# Patient Record
Sex: MALE | Race: WHITE | HISPANIC OR LATINO | URBAN - METROPOLITAN AREA
[De-identification: names, ages, dates, MRNs, and addresses within clinical notes are randomized per-mention and may not be internally consistent; named-entity substitution may affect disease eponyms.]

---

## 2019-04-30 ENCOUNTER — INPATIENT (INPATIENT)
Facility: HOSPITAL | Age: 69
LOS: 11 days | Discharge: HOME | DRG: 234 | End: 2019-05-12
Attending: THORACIC SURGERY (CARDIOTHORACIC VASCULAR SURGERY) | Admitting: HOSPITALIST
Payer: MEDICARE

## 2019-04-30 VITALS
TEMPERATURE: 99 F | WEIGHT: 229.94 LBS | HEART RATE: 73 BPM | RESPIRATION RATE: 18 BRPM | OXYGEN SATURATION: 98 % | DIASTOLIC BLOOD PRESSURE: 105 MMHG | SYSTOLIC BLOOD PRESSURE: 174 MMHG | HEIGHT: 69 IN

## 2019-04-30 DIAGNOSIS — I20.9 ANGINA PECTORIS, UNSPECIFIED: ICD-10-CM

## 2019-04-30 LAB
ALBUMIN SERPL ELPH-MCNC: 4.2 G/DL — SIGNIFICANT CHANGE UP (ref 3.3–5.2)
ALP SERPL-CCNC: 76 U/L — SIGNIFICANT CHANGE UP (ref 40–120)
ALT FLD-CCNC: 14 U/L — SIGNIFICANT CHANGE UP
ANION GAP SERPL CALC-SCNC: 13 MMOL/L — SIGNIFICANT CHANGE UP (ref 5–17)
APTT BLD: 36.7 SEC — HIGH (ref 27.5–36.3)
AST SERPL-CCNC: 19 U/L — SIGNIFICANT CHANGE UP
BASOPHILS # BLD AUTO: 0 K/UL — SIGNIFICANT CHANGE UP (ref 0–0.2)
BASOPHILS NFR BLD AUTO: 0.2 % — SIGNIFICANT CHANGE UP (ref 0–2)
BILIRUB SERPL-MCNC: 0.5 MG/DL — SIGNIFICANT CHANGE UP (ref 0.4–2)
BUN SERPL-MCNC: 14 MG/DL — SIGNIFICANT CHANGE UP (ref 8–20)
CALCIUM SERPL-MCNC: 9.1 MG/DL — SIGNIFICANT CHANGE UP (ref 8.6–10.2)
CHLORIDE SERPL-SCNC: 100 MMOL/L — SIGNIFICANT CHANGE UP (ref 98–107)
CO2 SERPL-SCNC: 24 MMOL/L — SIGNIFICANT CHANGE UP (ref 22–29)
CREAT SERPL-MCNC: 0.95 MG/DL — SIGNIFICANT CHANGE UP (ref 0.5–1.3)
EOSINOPHIL # BLD AUTO: 0.1 K/UL — SIGNIFICANT CHANGE UP (ref 0–0.5)
EOSINOPHIL NFR BLD AUTO: 2.5 % — SIGNIFICANT CHANGE UP (ref 0–5)
GLUCOSE SERPL-MCNC: 101 MG/DL — SIGNIFICANT CHANGE UP (ref 70–115)
HCT VFR BLD CALC: 40.3 % — LOW (ref 42–52)
HGB BLD-MCNC: 13.2 G/DL — LOW (ref 14–18)
INR BLD: 1.03 RATIO — SIGNIFICANT CHANGE UP (ref 0.88–1.16)
LYMPHOCYTES # BLD AUTO: 1.6 K/UL — SIGNIFICANT CHANGE UP (ref 1–4.8)
LYMPHOCYTES # BLD AUTO: 36.2 % — SIGNIFICANT CHANGE UP (ref 20–55)
MCHC RBC-ENTMCNC: 23.2 PG — LOW (ref 27–31)
MCHC RBC-ENTMCNC: 32.8 G/DL — SIGNIFICANT CHANGE UP (ref 32–36)
MCV RBC AUTO: 70.8 FL — LOW (ref 80–94)
MONOCYTES # BLD AUTO: 0.5 K/UL — SIGNIFICANT CHANGE UP (ref 0–0.8)
MONOCYTES NFR BLD AUTO: 10.7 % — HIGH (ref 3–10)
NEUTROPHILS # BLD AUTO: 2.2 K/UL — SIGNIFICANT CHANGE UP (ref 1.8–8)
NEUTROPHILS NFR BLD AUTO: 50.2 % — SIGNIFICANT CHANGE UP (ref 37–73)
NT-PROBNP SERPL-SCNC: 35 PG/ML — SIGNIFICANT CHANGE UP (ref 0–300)
PLATELET # BLD AUTO: 176 K/UL — SIGNIFICANT CHANGE UP (ref 150–400)
POTASSIUM SERPL-MCNC: 4.2 MMOL/L — SIGNIFICANT CHANGE UP (ref 3.5–5.3)
POTASSIUM SERPL-SCNC: 4.2 MMOL/L — SIGNIFICANT CHANGE UP (ref 3.5–5.3)
PROT SERPL-MCNC: 8 G/DL — SIGNIFICANT CHANGE UP (ref 6.6–8.7)
PROTHROM AB SERPL-ACNC: 11.9 SEC — SIGNIFICANT CHANGE UP (ref 10–12.9)
RBC # BLD: 5.69 M/UL — SIGNIFICANT CHANGE UP (ref 4.6–6.2)
RBC # FLD: 15.7 % — HIGH (ref 11–15.6)
SODIUM SERPL-SCNC: 137 MMOL/L — SIGNIFICANT CHANGE UP (ref 135–145)
TROPONIN T SERPL-MCNC: <0.01 NG/ML — SIGNIFICANT CHANGE UP (ref 0–0.06)
TROPONIN T SERPL-MCNC: <0.01 NG/ML — SIGNIFICANT CHANGE UP (ref 0–0.06)
WBC # BLD: 4.5 K/UL — LOW (ref 4.8–10.8)
WBC # FLD AUTO: 4.5 K/UL — LOW (ref 4.8–10.8)

## 2019-04-30 PROCEDURE — 71046 X-RAY EXAM CHEST 2 VIEWS: CPT | Mod: 26

## 2019-04-30 PROCEDURE — 93010 ELECTROCARDIOGRAM REPORT: CPT | Mod: 76

## 2019-04-30 PROCEDURE — 75574 CT ANGIO HRT W/3D IMAGE: CPT | Mod: 26

## 2019-04-30 PROCEDURE — 99218: CPT

## 2019-04-30 PROCEDURE — 99223 1ST HOSP IP/OBS HIGH 75: CPT | Mod: AI

## 2019-04-30 RX ORDER — METOPROLOL TARTRATE 50 MG
12.5 TABLET ORAL EVERY 12 HOURS
Qty: 0 | Refills: 0 | Status: DISCONTINUED | OUTPATIENT
Start: 2019-04-30 | End: 2019-05-02

## 2019-04-30 RX ORDER — NITROGLYCERIN 6.5 MG
1 CAPSULE, EXTENDED RELEASE ORAL EVERY 8 HOURS
Qty: 0 | Refills: 0 | Status: DISCONTINUED | OUTPATIENT
Start: 2019-04-30 | End: 2019-05-03

## 2019-04-30 RX ORDER — GEMFIBROZIL 600 MG
600 TABLET ORAL
Qty: 0 | Refills: 0 | Status: DISCONTINUED | OUTPATIENT
Start: 2019-04-30 | End: 2019-05-04

## 2019-04-30 RX ORDER — ASPIRIN/CALCIUM CARB/MAGNESIUM 324 MG
325 TABLET ORAL ONCE
Qty: 0 | Refills: 0 | Status: COMPLETED | OUTPATIENT
Start: 2019-04-30 | End: 2019-04-30

## 2019-04-30 RX ORDER — ENOXAPARIN SODIUM 100 MG/ML
40 INJECTION SUBCUTANEOUS DAILY
Qty: 0 | Refills: 0 | Status: DISCONTINUED | OUTPATIENT
Start: 2019-04-30 | End: 2019-05-01

## 2019-04-30 RX ORDER — METOPROLOL TARTRATE 50 MG
25 TABLET ORAL
Qty: 0 | Refills: 0 | Status: DISCONTINUED | OUTPATIENT
Start: 2019-04-30 | End: 2019-04-30

## 2019-04-30 RX ORDER — AMLODIPINE BESYLATE 2.5 MG/1
5 TABLET ORAL DAILY
Qty: 0 | Refills: 0 | Status: DISCONTINUED | OUTPATIENT
Start: 2019-04-30 | End: 2019-05-03

## 2019-04-30 RX ORDER — ATORVASTATIN CALCIUM 80 MG/1
20 TABLET, FILM COATED ORAL AT BEDTIME
Qty: 0 | Refills: 0 | Status: DISCONTINUED | OUTPATIENT
Start: 2019-04-30 | End: 2019-05-04

## 2019-04-30 RX ORDER — METOPROLOL TARTRATE 50 MG
50 TABLET ORAL ONCE
Qty: 0 | Refills: 0 | Status: COMPLETED | OUTPATIENT
Start: 2019-04-30 | End: 2019-04-30

## 2019-04-30 RX ORDER — ONDANSETRON 8 MG/1
4 TABLET, FILM COATED ORAL ONCE
Qty: 0 | Refills: 0 | Status: COMPLETED | OUTPATIENT
Start: 2019-04-30 | End: 2019-04-30

## 2019-04-30 RX ORDER — ASPIRIN/CALCIUM CARB/MAGNESIUM 324 MG
81 TABLET ORAL DAILY
Qty: 0 | Refills: 0 | Status: DISCONTINUED | OUTPATIENT
Start: 2019-04-30 | End: 2019-05-06

## 2019-04-30 RX ORDER — MORPHINE SULFATE 50 MG/1
2 CAPSULE, EXTENDED RELEASE ORAL ONCE
Qty: 0 | Refills: 0 | Status: DISCONTINUED | OUTPATIENT
Start: 2019-04-30 | End: 2019-04-30

## 2019-04-30 RX ADMIN — MORPHINE SULFATE 2 MILLIGRAM(S): 50 CAPSULE, EXTENDED RELEASE ORAL at 19:08

## 2019-04-30 RX ADMIN — MORPHINE SULFATE 2 MILLIGRAM(S): 50 CAPSULE, EXTENDED RELEASE ORAL at 18:24

## 2019-04-30 RX ADMIN — AMLODIPINE BESYLATE 5 MILLIGRAM(S): 2.5 TABLET ORAL at 18:23

## 2019-04-30 RX ADMIN — ATORVASTATIN CALCIUM 20 MILLIGRAM(S): 80 TABLET, FILM COATED ORAL at 22:39

## 2019-04-30 RX ADMIN — Medication 50 MILLIGRAM(S): at 12:20

## 2019-04-30 RX ADMIN — Medication 1 INCH(S): at 22:39

## 2019-04-30 RX ADMIN — Medication 600 MILLIGRAM(S): at 18:23

## 2019-04-30 RX ADMIN — ONDANSETRON 4 MILLIGRAM(S): 8 TABLET, FILM COATED ORAL at 21:18

## 2019-04-30 RX ADMIN — ENOXAPARIN SODIUM 40 MILLIGRAM(S): 100 INJECTION SUBCUTANEOUS at 18:24

## 2019-04-30 RX ADMIN — Medication 325 MILLIGRAM(S): at 12:19

## 2019-04-30 NOTE — ED ADULT NURSE NOTE - OBJECTIVE STATEMENT
pt awake, alert and oriented x3 c/o left sided chest pain radiating to left shoulder that began on Thursday with associated shortness of breath.  States he saw a cardiologist > 1 year ago.  Pt admits to drinking about 6 beers daily, more on the weekend.  Last drink last night.  NSR on cardiac monitor.  Did not take blood pressure medication today.  Respirations even and unlabored, denies chest pain.  Abdomen soft, nontender, nondistended.  Skin warm and dry.  Moving all extremities well and with purpose.

## 2019-04-30 NOTE — ED CDU PROVIDER DISPOSITION NOTE - CLINICAL COURSE
68 year old male with PMh HTN, HLD, etoh abuse presnets with chets pain, worse with exertion. Place don Obs, CT cardiac showed significant stenosis

## 2019-04-30 NOTE — SBIRT NOTE. - NSSBIRTSERVICES_GEN_A_ED_FT
Provided SBIRT services: Full screen positive. Brief Intervention Performed. Screening results were reviewed with the patient and patient was provided information about healthy guidelines and potential negative consequences associated with level of risk. Motivation and readiness to reduce or stop use was discussed and goals and activities to make changes were suggested/offered.  Audit Score: 14  DAST Score: 0  Duration = 15 Minutes

## 2019-04-30 NOTE — ED CDU PROVIDER INITIAL DAY NOTE - OBJECTIVE STATEMENT
Pt is a 69 y/o M, PMH significant for HTN, hypertriglyceridemia, esophageal varices, daily ETOH, presents to ED c/o L sided CP with ESCOBAR since Thursday. Pt states he was lifting boxes at work this Thursday when he became short or breath and felt a dull ache in this L sided of his chest radiating into his L arm. Pt states the symptoms have persisted and now has a tingling sensation radiating down his L arm into his L 5th digit. Pt currently describes the sensation as a "ache". Pt primary cardiologist is Dr. Duran ( last appointment > 1 year ago ). Pt has no personal cardiac history but states his brother had a cardiac cath in his 50s for episodes of CP. Pt with smoking history 30 years ago. Pt sates he drinks approximately 6 beers daily, last drink was last night, no history of withdrawals.

## 2019-04-30 NOTE — H&P ADULT - HISTORY OF PRESENT ILLNESS
67 yo male obese with HTN , alcoholism presented to the ED with complaints of left sided chest pain and shortness of breath . He states that has been feeling left chest discomfort since Thursday on off worsening sharp pain with no relieve completly , radiates to his back and left shoulder . associated with dyspnea on exertion heavy work , at present has pain 4/10 , he admits drinking 4-6 beers almost daily in the evening , last drink was last night . Evaluated in the Ed by cardiology m CTA of coronaries done showed CAD plaque , now being admitted for cardiac cath unstable angina .

## 2019-04-30 NOTE — ED STATDOCS - PROGRESS NOTE DETAILS
67 y/o M pt with hx of HLD presents to the ED with daughter c/o worsening L sided chest pain radiating into back, with assoc. ESCOBAR, and L arm tingling/numbness into hand since Thursday. Per daughter states pt last saw his cardiologist about 1 year ago. Pt is a non-smoker. Denies . No further complaints at this time.   PMD: Gutierrez  EKG: No acute ischemic changes.

## 2019-04-30 NOTE — ED ADULT NURSE REASSESSMENT NOTE - NS ED NURSE REASSESS COMMENT FT1
Assumed pt care at 1230.  pt received sleeping in stretcher, arousable to verbal stimuli.  NSR on cardiac monitor.  maintaining airway well.  pt received in yellow gown with no belongings at bedside. Will continue to monitor and reassess.
pt being admitted, hospitalist and family at bedside, no apparent distress noted

## 2019-04-30 NOTE — H&P ADULT - NSHPLABSRESULTS_GEN_ALL_CORE
13.2   4.5   )-----------( 176      ( 30 Apr 2019 12:08 )             40.3   04-30    137  |  100  |  14.0  ----------------------------<  101  4.2   |  24.0  |  0.95    Ca    9.1      30 Apr 2019 12:08  Mg     1.9     04-30    TPro  8.0  /  Alb  4.2  /  TBili  0.5  /  DBili  x   /  AST  19  /  ALT  14  /  AlkPhos  76  04-30  < from: CT Angio Cardiac w/ IV Cont (04.30.19 @ 15:55) >    IMPRESSION:   Significant stenosis of the mid LAD adjacent calcification as well as mid   circumflex.  Left ventricular systolic function and wall motion: Normal with LV   ejection fraction of 77%.  Coronary artery calcification as described above.    Please see radiologist addendum regarding non-cardiac structures.   Findings were discussed with Dr. De La O in  ED.         < end of copied text >

## 2019-04-30 NOTE — H&P ADULT - NSICDXFAMILYHX_GEN_ALL_CORE_FT
FAMILY HISTORY:  FH: Alzheimers disease, father  at age 74  FH: CAD (coronary artery disease), brother dx ed at age 62

## 2019-04-30 NOTE — ED ADULT NURSE NOTE - CHPI ED NUR SYMPTOMS POS
Additional Notes: Triderm cream to AA, pt takes Allegra 180mg daily, Benadryl, singular qhs\\n\\nAdvised she can take 2 tabs Allegra Detail Level: Simple SHORTNESS OF BREATH/CHEST PAIN

## 2019-04-30 NOTE — H&P ADULT - ASSESSMENT
69 yo male with HTN , obese , hyperlipidemia , alcoholism admitted for unstable angina , CTA of coronaries positive CAD   admit , cardiology input noted for [facility]     1- ACS unstable angina   aspirin , b blocker ,statin   cardiology follwo up for [facility] in am   will give nitro paste for angina /CP still with pain 4/10   d/w daughter and the patient     2- HTN - cont home meds amlodipine     3- Alcoholism   monitor for withdrawal , counseling given to stop drinking risks explained to him in details   folic acid thiamine   CIWA with triggered point librium as needed     4- Hyperlipedima   cont lopid , add statin   lipid profile in am     PCP is DR Gutierrez is notified

## 2019-04-30 NOTE — ED CDU PROVIDER INITIAL DAY NOTE - PROGRESS NOTE DETAILS
Pt evaluated at bedside. HPI/PE/ROS as noted above. Will follow up plan per physician. Pt evaluated at bedside. HPI/PE/ROS as noted above. Daughter reports possible use of viagara in pt. Will follow up plan per physician.

## 2019-04-30 NOTE — ED CDU PROVIDER DISPOSITION NOTE - ATTENDING CONTRIBUTION TO CARE
I, Ankur De La O, performed a face to face bedside interview with this patient regarding history of present illness, review of symptoms and relevant past medical, social and family history.  I completed an independent physical examination. I have communicated the patient’s plan of care and disposition with the ACP.  68 year old with htn, hld, etoh abuse presents with 5 days chest pain, ekg non-ischemic, placed on Obs for ct cardiac, which was positive, admitted for cath  Gen: NAD, well appearing  CV: RRR  Pul: CTA b/l  Abd: Soft, non-distended, non-tender  Neuro: no focal deficits

## 2019-04-30 NOTE — ED PROVIDER NOTE - PROGRESS NOTE DETAILS
PA note: First set trop WNL. Pt pending CT cardiac and Grady cards consult. Pt to be placed in observation unit for diagnostic uncertainty.

## 2019-04-30 NOTE — ED PROVIDER NOTE - OBJECTIVE STATEMENT
Pt is a 69 y/o M, PMH significant for HTN, hypertriglyceridemia, esophageal varices, daily ETOH, presents to ED c/o L sided CP with ESCOBAR since Thursday. Pt states he was lifting boxes at work this Thursday when he became short or breath and felt a dull ache in this L sided of his chest radiating into his L arm. Pt states the symptoms have persisted and now has a tingling sensation radiating down his L arm into his L 5th digit. Pt currently describes the sensation as a "ache". Pt primary cardiologist is Dr. Gardner ( last appointment > 1 year ago ). Pt has no personal cardiac history but states his brother had a cardiac cath in his 50s for episodes of CP. Pt with smoking history 30 years ago. Pt sates he drinks approximately 6 beers daily, last drink was last night, no history of withdrawals. Pt is a 69 y/o M, PMH significant for HTN, hypertriglyceridemia, esophageal varices, daily ETOH, presents to ED c/o L sided CP with ESCOBAR since Thursday. Pt states he was lifting boxes at work this Thursday when he became short or breath and felt a dull ache in this L sided of his chest radiating into his L arm. Pt states the symptoms have persisted and now has a tingling sensation radiating down his L arm into his L 5th digit. Pt currently describes the sensation as a "ache". Pt primary cardiologist is Dr. Cesar ( last appointment > 1 year ago ). Pt has no personal cardiac history but states his brother had a cardiac cath in his 50s for episodes of CP. Pt with smoking history 30 years ago. Pt sates he drinks approximately 6 beers daily, last drink was last night, no history of withdrawals. Pt is a 67 y/o M, PMH significant for HTN, hypertriglyceridemia, esophageal varices, daily ETOH, presents to ED c/o L sided CP with ESCOBAR since Thursday. Pt states he was lifting boxes at work this Thursday when he became short or breath and felt a dull ache in this L sided of his chest radiating into his L arm. Pt states the symptoms have persisted and now has a tingling sensation radiating down his L arm into his L 5th digit. Pt currently describes the sensation as a "ache". Pt primary cardiologist is Dr. Duran ( last appointment > 1 year ago ). Pt has no personal cardiac history but states his brother had a cardiac cath in his 50s for episodes of CP. Pt with smoking history 30 years ago. Pt sates he drinks approximately 6 beers daily, last drink was last night, no history of withdrawals.

## 2019-04-30 NOTE — ED PROVIDER NOTE - ATTENDING CONTRIBUTION TO CARE
I, Maria Guadalupe Richardson, independently evaluated the patient. The PA made the initial evaluation and discussed history, physical and plan with me and I agree.     67 y/o M with h/o HTN, hypertriglyceridemia, esophageal varices, daily EtOH who presented with exertional chest pain with ESCOBAR for the past 4 days.     Exam: NAD, RRR, lungs CTA b/l, abd soft, non-tender.    EKG shows no ischemia, trop normal. Will place in observation for cycle of troponin, cardiology consult and diagnostic uncertainty.

## 2019-04-30 NOTE — ED PROVIDER NOTE - CLINICAL SUMMARY MEDICAL DECISION MAKING FREE TEXT BOX
69 y/o M presents to ED with L sided CP, ESCOBAR, and tingling sensation in L arm x 5 days. Pt w/o significant cardiac history. labs, CXR, EKG, cardiology consult ( Guille Todd ), trops, and reassess. 67 y/o M presents to ED with L sided CP, ESCOBAR, and tingling sensation in L arm x 5 days. Pt w/o significant cardiac history. labs, CXR, EKG, cardiology consult ( Dr. Duran ), trops, and reassess.

## 2019-05-01 DIAGNOSIS — I10 ESSENTIAL (PRIMARY) HYPERTENSION: ICD-10-CM

## 2019-05-01 DIAGNOSIS — E78.00 PURE HYPERCHOLESTEROLEMIA, UNSPECIFIED: ICD-10-CM

## 2019-05-01 DIAGNOSIS — F10.10 ALCOHOL ABUSE, UNCOMPLICATED: ICD-10-CM

## 2019-05-01 DIAGNOSIS — I25.118 ATHEROSCLEROTIC HEART DISEASE OF NATIVE CORONARY ARTERY WITH OTHER FORMS OF ANGINA PECTORIS: ICD-10-CM

## 2019-05-01 LAB
ALBUMIN SERPL ELPH-MCNC: 3.7 G/DL — SIGNIFICANT CHANGE UP (ref 3.3–5.2)
ALP SERPL-CCNC: 67 U/L — SIGNIFICANT CHANGE UP (ref 40–120)
ALT FLD-CCNC: 13 U/L — SIGNIFICANT CHANGE UP
ANION GAP SERPL CALC-SCNC: 14 MMOL/L — SIGNIFICANT CHANGE UP (ref 5–17)
APPEARANCE UR: CLEAR — SIGNIFICANT CHANGE UP
AST SERPL-CCNC: 18 U/L — SIGNIFICANT CHANGE UP
BACTERIA # UR AUTO: ABNORMAL
BILIRUB DIRECT SERPL-MCNC: 0.1 MG/DL — SIGNIFICANT CHANGE UP (ref 0–0.3)
BILIRUB INDIRECT FLD-MCNC: 0.7 MG/DL — SIGNIFICANT CHANGE UP (ref 0.2–1)
BILIRUB SERPL-MCNC: 0.8 MG/DL — SIGNIFICANT CHANGE UP (ref 0.4–2)
BILIRUB UR-MCNC: NEGATIVE — SIGNIFICANT CHANGE UP
BUN SERPL-MCNC: 16 MG/DL — SIGNIFICANT CHANGE UP (ref 8–20)
CALCIUM SERPL-MCNC: 8.7 MG/DL — SIGNIFICANT CHANGE UP (ref 8.6–10.2)
CHLORIDE SERPL-SCNC: 100 MMOL/L — SIGNIFICANT CHANGE UP (ref 98–107)
CHOLEST SERPL-MCNC: 209 MG/DL — HIGH (ref 110–199)
CO2 SERPL-SCNC: 22 MMOL/L — SIGNIFICANT CHANGE UP (ref 22–29)
COLOR SPEC: YELLOW — SIGNIFICANT CHANGE UP
CREAT SERPL-MCNC: 0.99 MG/DL — SIGNIFICANT CHANGE UP (ref 0.5–1.3)
DIFF PNL FLD: ABNORMAL
EPI CELLS # UR: SIGNIFICANT CHANGE UP
GLUCOSE SERPL-MCNC: 101 MG/DL — SIGNIFICANT CHANGE UP (ref 70–115)
GLUCOSE UR QL: NEGATIVE MG/DL — SIGNIFICANT CHANGE UP
HCV AB S/CO SERPL IA: 0.07 S/CO — SIGNIFICANT CHANGE UP (ref 0–0.99)
HCV AB SERPL-IMP: SIGNIFICANT CHANGE UP
HDLC SERPL-MCNC: 33 MG/DL — LOW
KETONES UR-MCNC: NEGATIVE — SIGNIFICANT CHANGE UP
LEUKOCYTE ESTERASE UR-ACNC: NEGATIVE — SIGNIFICANT CHANGE UP
LIPID PNL WITH DIRECT LDL SERPL: 108 MG/DL — SIGNIFICANT CHANGE UP
MAGNESIUM SERPL-MCNC: 2 MG/DL — SIGNIFICANT CHANGE UP (ref 1.6–2.6)
NITRITE UR-MCNC: POSITIVE
PH UR: 7 — SIGNIFICANT CHANGE UP (ref 5–8)
PHOSPHATE SERPL-MCNC: 3.2 MG/DL — SIGNIFICANT CHANGE UP (ref 2.4–4.7)
POTASSIUM SERPL-MCNC: 4 MMOL/L — SIGNIFICANT CHANGE UP (ref 3.5–5.3)
POTASSIUM SERPL-SCNC: 4 MMOL/L — SIGNIFICANT CHANGE UP (ref 3.5–5.3)
PROT SERPL-MCNC: 7.3 G/DL — SIGNIFICANT CHANGE UP (ref 6.6–8.7)
PROT UR-MCNC: 15 MG/DL
RBC CASTS # UR COMP ASSIST: SIGNIFICANT CHANGE UP /HPF (ref 0–4)
SODIUM SERPL-SCNC: 136 MMOL/L — SIGNIFICANT CHANGE UP (ref 135–145)
SP GR SPEC: 1.01 — SIGNIFICANT CHANGE UP (ref 1.01–1.02)
TOTAL CHOLESTEROL/HDL RATIO MEASUREMENT: 6 RATIO — SIGNIFICANT CHANGE UP (ref 3.4–9.6)
TRIGL SERPL-MCNC: 340 MG/DL — HIGH (ref 10–200)
TROPONIN T SERPL-MCNC: <0.01 NG/ML — SIGNIFICANT CHANGE UP (ref 0–0.06)
UROBILINOGEN FLD QL: NEGATIVE MG/DL — SIGNIFICANT CHANGE UP
WBC UR QL: NEGATIVE — SIGNIFICANT CHANGE UP

## 2019-05-01 PROCEDURE — 93010 ELECTROCARDIOGRAM REPORT: CPT

## 2019-05-01 PROCEDURE — 99232 SBSQ HOSP IP/OBS MODERATE 35: CPT

## 2019-05-01 PROCEDURE — 93880 EXTRACRANIAL BILAT STUDY: CPT | Mod: 26

## 2019-05-01 PROCEDURE — 99221 1ST HOSP IP/OBS SF/LOW 40: CPT | Mod: 57

## 2019-05-01 RX ORDER — MUPIROCIN 20 MG/G
1 OINTMENT TOPICAL
Qty: 0 | Refills: 0 | Status: DISCONTINUED | OUTPATIENT
Start: 2019-05-01 | End: 2019-05-04

## 2019-05-01 RX ORDER — FOLIC ACID 0.8 MG
1 TABLET ORAL DAILY
Qty: 0 | Refills: 0 | Status: DISCONTINUED | OUTPATIENT
Start: 2019-05-01 | End: 2019-05-07

## 2019-05-01 RX ORDER — THIAMINE MONONITRATE (VIT B1) 100 MG
100 TABLET ORAL DAILY
Qty: 0 | Refills: 0 | Status: COMPLETED | OUTPATIENT
Start: 2019-05-01 | End: 2019-05-03

## 2019-05-01 RX ORDER — INFLUENZA VIRUS VACCINE 15; 15; 15; 15 UG/.5ML; UG/.5ML; UG/.5ML; UG/.5ML
0.5 SUSPENSION INTRAMUSCULAR ONCE
Qty: 0 | Refills: 0 | Status: COMPLETED | OUTPATIENT
Start: 2019-05-01 | End: 2019-05-01

## 2019-05-01 RX ORDER — CHLORHEXIDINE GLUCONATE 213 G/1000ML
1 SOLUTION TOPICAL
Qty: 0 | Refills: 0 | Status: DISCONTINUED | OUTPATIENT
Start: 2019-05-01 | End: 2019-05-02

## 2019-05-01 RX ORDER — CHLORHEXIDINE GLUCONATE 213 G/1000ML
15 SOLUTION TOPICAL
Qty: 0 | Refills: 0 | Status: DISCONTINUED | OUTPATIENT
Start: 2019-05-01 | End: 2019-05-07

## 2019-05-01 RX ORDER — PANTOPRAZOLE SODIUM 20 MG/1
40 TABLET, DELAYED RELEASE ORAL
Qty: 0 | Refills: 0 | Status: DISCONTINUED | OUTPATIENT
Start: 2019-05-01 | End: 2019-05-07

## 2019-05-01 RX ADMIN — Medication 600 MILLIGRAM(S): at 05:23

## 2019-05-01 RX ADMIN — Medication 12.5 MILLIGRAM(S): at 10:15

## 2019-05-01 RX ADMIN — Medication 100 MILLIGRAM(S): at 13:17

## 2019-05-01 RX ADMIN — PANTOPRAZOLE SODIUM 40 MILLIGRAM(S): 20 TABLET, DELAYED RELEASE ORAL at 10:15

## 2019-05-01 RX ADMIN — Medication 1 INCH(S): at 05:23

## 2019-05-01 RX ADMIN — Medication 1 INCH(S): at 22:15

## 2019-05-01 RX ADMIN — Medication 1 MILLIGRAM(S): at 13:19

## 2019-05-01 RX ADMIN — ATORVASTATIN CALCIUM 20 MILLIGRAM(S): 80 TABLET, FILM COATED ORAL at 22:15

## 2019-05-01 RX ADMIN — AMLODIPINE BESYLATE 5 MILLIGRAM(S): 2.5 TABLET ORAL at 05:23

## 2019-05-01 RX ADMIN — Medication 81 MILLIGRAM(S): at 13:18

## 2019-05-01 NOTE — PROGRESS NOTE ADULT - ASSESSMENT
1. 67 yo male with chest discomfort. CTA revealed TVCAD with 70% lad and circ diease and 50% rca. Discussed signif of test with pt and family. Recommend proceeding to cath and possible intervention. Pt agreeable.  2. hpt.

## 2019-05-01 NOTE — PROGRESS NOTE ADULT - SUBJECTIVE AND OBJECTIVE BOX
68y Male who had a LHC, no intervention was found to have 3 Vessel disease including LAD, LCX and RCA, via Right radial artery band in place. Patient awake and alert without complaints Denies chest pain, sob, palps.       Neuro: A&OX3  Lungs: CTA B/L  CV: S1, S2, no murmur, RRR  Abd: Soft  Right Wrist no bleeding, no hematoma, no ecchymosis  Extremity: + distal pulses, cap refill < 3 sec      A/P: 68y Male s/p LHC no intervention  1. Wrist management discussed with patient  2. Continue current meds  3.  cABG eval with Dr. Soto  4. Bedrest x 1 hour  5. Remove wrist band in hour  6. Follow up wrist check in am

## 2019-05-01 NOTE — PROGRESS NOTE ADULT - ASSESSMENT
69 yo male with alcoholism , hyperlipidemia, HTN obese admitted for ACS , CTA of coronaries positive LAD lesion , CAD   [facility] today       1- ACS - troponin negative   still with mild left sided pain   family refused NTP   cont aspirin , statin , low dose B blocker   pending cardio follow up for [facility] today   repeat ECG ordered   TTE   2- HTN   cont home meds, controlled   3- Alcoholism   added folic acid , thiamine   CIWA protocol ativan as needed   4- Hyperlipidemia   cont lopid and statin

## 2019-05-01 NOTE — PROGRESS NOTE ADULT - SUBJECTIVE AND OBJECTIVE BOX
Patient is a 68y old  Male who presents with a chief complaint of chest pain   He is seen for Dr Gutierrez as his request   pt is resting in the bed , pain is better has some left shoulder discomfort , no shortness of breath   d/w daughter who is ED PA at the bedside   she states that she refused NTP since her dad may have been using Viagra which he did not admit to ( prefers not to answer )               Vital Signs Last 24 Hrs  T(C): 36.7 (01 May 2019 08:07), Max: 37 (30 Apr 2019 11:19)  T(F): 98 (01 May 2019 08:07), Max: 98.6 (30 Apr 2019 11:19)  HR: 51 (01 May 2019 08:07) (51 - 73)  BP: 129/76 (01 May 2019 08:07) (115/65 - 174/105)  BP(mean): --  RR: 18 (01 May 2019 08:07) (16 - 18)  SpO2: 99% (01 May 2019 08:07) (96% - 99%)    PHYSICAL EXAM:    GENERAL: NAD, well-groomed, obese   NECK: Supple, No JVD, Normal thyroid  CHEST/LUNG: Clear to auscultation  bilaterally; No rales, rhonchi, wheezing  HEART: Regular rate and rhythm; s1 /s2 No murmurs  ABDOMEN: Soft, Nontender, Nondistended; Bowel sounds present  EXTREMITIES: No clubbing, cyanosis, or edema  SKIN: No rash     troponin x3 negative       LABS:                        13.2   4.5   )-----------( 176      ( 30 Apr 2019 12:08 )             40.3     04-30    137  |  100  |  14.0  ----------------------------<  101  4.2   |  24.0  |  0.95    Ca    9.1      30 Apr 2019 12:08  Mg     1.9     04-30    TPro  8.0  /  Alb  4.2  /  TBili  0.5  /  DBili  x   /  AST  19  /  ALT  14  /  AlkPhos  76  04-30    PT/INR - ( 30 Apr 2019 12:09 )   PT: 11.9 sec;   INR: 1.03 ratio         PTT - ( 30 Apr 2019 12:09 )  PTT:36.7 sec      RADIOLOGY & ADDITIONAL TESTS:

## 2019-05-01 NOTE — PROGRESS NOTE ADULT - SUBJECTIVE AND OBJECTIVE BOX
Interventional Cardiology PA Precath Note      HPI: 67 yo male adm with chest pain. Family hx + cad-brother-hx of hyperlipidemia and hpt. Had CTA with Ca+ score yesterday which showed 70% LAD and circ lesions and elevated ca++ score. No hx of MI/diabetes renal hx. No allergy. Former smoker.         ALL: NKDA, NKFA. Denies shellfish/Contrast dye allergy.  PAST MEDICAL & SURGICAL HISTORY:  ETOH abuse  Esophageal varices  Hyperlipidemia  Hypertension  No significant past surgical history    SocHX: Denies EtoH/TOB/IVDU    MEDS:   amLODIPine   Tablet 5 milliGRAM(s) Oral daily  aspirin  chewable 81 milliGRAM(s) Oral daily  atorvastatin 20 milliGRAM(s) Oral at bedtime  folic acid 1 milliGRAM(s) Oral daily  gemfibrozil 600 milliGRAM(s) Oral two times a day  LORazepam     Tablet 1 milliGRAM(s) Oral every 2 hours PRN  metoprolol tartrate 12.5 milliGRAM(s) Oral every 12 hours  nitroglycerin    2% Ointment 1 Inch(s) Transdermal every 8 hours  pantoprazole    Tablet 40 milliGRAM(s) Oral before breakfast  thiamine 100 milliGRAM(s) Oral daily    T(C): 36.7 (05-01-19 @ 08:07), Max: 36.8 (04-30-19 @ 16:02)  HR: 51 (05-01-19 @ 08:07) (51 - 60)  BP: 129/76 (05-01-19 @ 08:07) (115/65 - 167/99)  RR: 18 (05-01-19 @ 08:07) (18 - 18)  SpO2: 99% (05-01-19 @ 08:07) (96% - 99%)  Wt(kg): --                              13.2   4.5   )-----------( 176      ( 30 Apr 2019 12:08 )             40.3     05-01    136  |  100  |  16.0  ----------------------------<  101  4.0   |  22.0  |  0.99    Ca    8.7      01 May 2019 07:54  Phos  3.2     05-01  Mg     2.0     05-01    TPro  7.3  /  Alb  3.7  /  TBili  0.8  /  DBili  0.1  /  AST  18  /  ALT  13  /  AlkPhos  67  05-01    CARDIAC MARKERS ( 01 May 2019 07:54 )  x     / <0.01 ng/mL / x     / x     / x      CARDIAC MARKERS ( 30 Apr 2019 20:06 )  x     / <0.01 ng/mL / x     / x     / x      CARDIAC MARKERS ( 30 Apr 2019 12:08 )  x     / <0.01 ng/mL / 53 U/L / x     / x            EKG:					  ASA ___2__				Mallampati class: _______2__	            Anginal Class: ____3_____    A/P:  67 yo male with chest discomfort for LHC  -Proceed with LHC as planned         Risks & benefits of procedure and sedation and risks and benefits for the alternative therapy have been explained to the patient in layman’s terms including but not limited to: allergic reaction, bleeding, infection, arrhythmia, respiratory compromise, renal and vascular compromise, limb damage, MI, CVA, emergent CABG/Vascular Surgery and death. Informed consent obtained and in chart by MD.

## 2019-05-01 NOTE — CONSULT NOTE ADULT - ASSESSMENT
THis is a 69 y/o M with a h/o ETOH with recent CP found to have CAD on CATH today.  CTS surgery called for surgical evaluation

## 2019-05-01 NOTE — PROGRESS NOTE ADULT - SUBJECTIVE AND OBJECTIVE BOX
Chief Complaint: chart and hx reviewed.    HPI: 69 yo male adm with chest pain. Family hx + cad-brother-hx of hyperlipidemia and hpt. Had CTA with Ca+ score yesterday which showed 70% LAD and circ lesions and elevated ca++ score. No hx of MI/diabetes renal hx. No allergy. Former smoker.    PAST MEDICAL & SURGICAL HISTORY:  ETOH abuse  Esophageal varices  Hyperlipidemia  Hypertension  No significant past surgical history      PREVIOUS DIAGNOSTIC TESTING:      ECHO  FINDINGS:    STRESS  FINDINGS:    CATHETERIZATION  FINDINGS:    MEDICATIONS  (STANDING):  amLODIPine   Tablet 5 milliGRAM(s) Oral daily  aspirin  chewable 81 milliGRAM(s) Oral daily  atorvastatin 20 milliGRAM(s) Oral at bedtime  folic acid 1 milliGRAM(s) Oral daily  gemfibrozil 600 milliGRAM(s) Oral two times a day  metoprolol tartrate 12.5 milliGRAM(s) Oral every 12 hours  nitroglycerin    2% Ointment 1 Inch(s) Transdermal every 8 hours  pantoprazole    Tablet 40 milliGRAM(s) Oral before breakfast  thiamine 100 milliGRAM(s) Oral daily    MEDICATIONS  (PRN):  LORazepam     Tablet 1 milliGRAM(s) Oral every 2 hours PRN CIWA-Ar score increase by 2 points and a total score of 7 or less      FAMILY HISTORY:  FH: CAD (coronary artery disease): brother dx ed at age 62  FH: Alzheimers disease: father  at age 74         ROS: Negative other than as mentioned in HPI.    Vital Signs Last 24 Hrs  T(C): 36.7 (01 May 2019 08:07), Max: 37 (2019 11:19)  T(F): 98 (01 May 2019 08:07), Max: 98.6 (2019 11:19)  HR: 60 reg (01 May 2019 08:07) (51 - 73)  BP: 140/90 (01 May 2019 08:07) (115/65 - 174/105)  BP(mean): --  RR: 14 flat ora (01 May 2019 08:07) (16 - 18)  SpO2: 99% (01 May 2019 08:07) (96% - 99%)    PHYSICAL EXAM:  General: Appears well developed, well nourished alert and cooperative. obese MA male nad.  HEENT: Head; normocephalic, atraumatic. poor dentition  Eyes;   Pupils reactive, cornea wnl.  Neck; Supple, no nodes adenopathy, no NVD or carotid bruit or thyromegaly.  CARDIOVASCULAR; No murmur, rub, gallop or lift. Normal S1 and S2.  LUNGS; No rales, rhonchi or wheeze. Normal breath sounds bilaterally.  ABDOMEN ; Soft, nontender without mass or organomegaly. bowel sounds normoactive.  EXTREMITIES; No clubbing, cyanosis or edema.  ROM normal.  SKIN; warm and dry with normal turgor.  NEURO; Alert/oriented x 3/normal motor exam.     PSYCH; normal affect.            INTERPRETATION OF TELEMETRY:    ECG: SR wnl    I&O's Detail      LABS:                        13.2   4.5   )-----------( 176      ( 2019 12:08 )             40.3     0430    137  |  100  |  14.0  ----------------------------<  101  4.2   |  24.0  |  0.95    Ca    9.1      2019 12:08  Mg     1.9         TPro  8.0  /  Alb  4.2  /  TBili  0.5  /  DBili  x   /  AST  19  /  ALT  14  /  AlkPhos  76  04-30    CARDIAC MARKERS ( 01 May 2019 07:54 )  x     / <0.01 ng/mL / x     / x     / x      CARDIAC MARKERS ( 2019 20:06 )  x     / <0.01 ng/mL / x     / x     / x      CARDIAC MARKERS ( 2019 12:08 )  x     / <0.01 ng/mL / 53 U/L / x     / x          PT/INR - ( 2019 12:09 )   PT: 11.9 sec;   INR: 1.03 ratio         PTT - ( 2019 12:09 )  PTT:36.7 sec    I&O's Summary      RADIOLOGY & ADDITIONAL STUDIES:

## 2019-05-02 DIAGNOSIS — I20.0 UNSTABLE ANGINA: ICD-10-CM

## 2019-05-02 DIAGNOSIS — I25.110 ATHEROSCLEROTIC HEART DISEASE OF NATIVE CORONARY ARTERY WITH UNSTABLE ANGINA PECTORIS: ICD-10-CM

## 2019-05-02 DIAGNOSIS — I10 ESSENTIAL (PRIMARY) HYPERTENSION: ICD-10-CM

## 2019-05-02 DIAGNOSIS — I25.10 ATHEROSCLEROTIC HEART DISEASE OF NATIVE CORONARY ARTERY WITHOUT ANGINA PECTORIS: ICD-10-CM

## 2019-05-02 DIAGNOSIS — Z98.890 OTHER SPECIFIED POSTPROCEDURAL STATES: ICD-10-CM

## 2019-05-02 DIAGNOSIS — E78.5 HYPERLIPIDEMIA, UNSPECIFIED: ICD-10-CM

## 2019-05-02 PROBLEM — Z00.00 ENCOUNTER FOR PREVENTIVE HEALTH EXAMINATION: Status: ACTIVE | Noted: 2019-05-02

## 2019-05-02 LAB
ANION GAP SERPL CALC-SCNC: 13 MMOL/L — SIGNIFICANT CHANGE UP (ref 5–17)
BUN SERPL-MCNC: 19 MG/DL — SIGNIFICANT CHANGE UP (ref 8–20)
CALCIUM SERPL-MCNC: 8.8 MG/DL — SIGNIFICANT CHANGE UP (ref 8.6–10.2)
CHLORIDE SERPL-SCNC: 100 MMOL/L — SIGNIFICANT CHANGE UP (ref 98–107)
CO2 SERPL-SCNC: 25 MMOL/L — SIGNIFICANT CHANGE UP (ref 22–29)
CREAT SERPL-MCNC: 1.08 MG/DL — SIGNIFICANT CHANGE UP (ref 0.5–1.3)
GLUCOSE SERPL-MCNC: 91 MG/DL — SIGNIFICANT CHANGE UP (ref 70–115)
HBA1C BLD-MCNC: 5.7 % — HIGH (ref 4–5.6)
MAGNESIUM SERPL-MCNC: 2.1 MG/DL — SIGNIFICANT CHANGE UP (ref 1.6–2.6)
MRSA PCR RESULT.: SIGNIFICANT CHANGE UP
PHOSPHATE SERPL-MCNC: 4.1 MG/DL — SIGNIFICANT CHANGE UP (ref 2.4–4.7)
POTASSIUM SERPL-MCNC: 4.7 MMOL/L — SIGNIFICANT CHANGE UP (ref 3.5–5.3)
POTASSIUM SERPL-SCNC: 4.7 MMOL/L — SIGNIFICANT CHANGE UP (ref 3.5–5.3)
PREALB SERPL-MCNC: 26 MG/DL — SIGNIFICANT CHANGE UP (ref 18–38)
S AUREUS DNA NOSE QL NAA+PROBE: SIGNIFICANT CHANGE UP
SODIUM SERPL-SCNC: 138 MMOL/L — SIGNIFICANT CHANGE UP (ref 135–145)

## 2019-05-02 PROCEDURE — 76705 ECHO EXAM OF ABDOMEN: CPT | Mod: 26

## 2019-05-02 PROCEDURE — 99232 SBSQ HOSP IP/OBS MODERATE 35: CPT

## 2019-05-02 PROCEDURE — 93306 TTE W/DOPPLER COMPLETE: CPT | Mod: 26

## 2019-05-02 RX ORDER — METOPROLOL TARTRATE 50 MG
25 TABLET ORAL
Qty: 0 | Refills: 0 | Status: DISCONTINUED | OUTPATIENT
Start: 2019-05-02 | End: 2019-05-06

## 2019-05-02 RX ORDER — ENOXAPARIN SODIUM 100 MG/ML
40 INJECTION SUBCUTANEOUS DAILY
Qty: 0 | Refills: 0 | Status: DISCONTINUED | OUTPATIENT
Start: 2019-05-02 | End: 2019-05-06

## 2019-05-02 RX ADMIN — Medication 1 INCH(S): at 17:38

## 2019-05-02 RX ADMIN — Medication 12.5 MILLIGRAM(S): at 09:03

## 2019-05-02 RX ADMIN — Medication 600 MILLIGRAM(S): at 05:15

## 2019-05-02 RX ADMIN — Medication 1 INCH(S): at 05:15

## 2019-05-02 RX ADMIN — PANTOPRAZOLE SODIUM 40 MILLIGRAM(S): 20 TABLET, DELAYED RELEASE ORAL at 05:15

## 2019-05-02 RX ADMIN — MUPIROCIN 1 APPLICATION(S): 20 OINTMENT TOPICAL at 17:40

## 2019-05-02 RX ADMIN — Medication 1 MILLIGRAM(S): at 09:01

## 2019-05-02 RX ADMIN — Medication 1 INCH(S): at 22:31

## 2019-05-02 RX ADMIN — Medication 100 MILLIGRAM(S): at 09:01

## 2019-05-02 RX ADMIN — CHLORHEXIDINE GLUCONATE 15 MILLILITER(S): 213 SOLUTION TOPICAL at 05:15

## 2019-05-02 RX ADMIN — Medication 81 MILLIGRAM(S): at 09:01

## 2019-05-02 RX ADMIN — Medication 600 MILLIGRAM(S): at 17:39

## 2019-05-02 RX ADMIN — CHLORHEXIDINE GLUCONATE 15 MILLILITER(S): 213 SOLUTION TOPICAL at 17:39

## 2019-05-02 RX ADMIN — CHLORHEXIDINE GLUCONATE 1 APPLICATION(S): 213 SOLUTION TOPICAL at 09:09

## 2019-05-02 RX ADMIN — AMLODIPINE BESYLATE 5 MILLIGRAM(S): 2.5 TABLET ORAL at 05:15

## 2019-05-02 RX ADMIN — Medication 25 MILLIGRAM(S): at 17:39

## 2019-05-02 RX ADMIN — ATORVASTATIN CALCIUM 20 MILLIGRAM(S): 80 TABLET, FILM COATED ORAL at 22:32

## 2019-05-02 RX ADMIN — CHLORHEXIDINE GLUCONATE 1 APPLICATION(S): 213 SOLUTION TOPICAL at 17:40

## 2019-05-02 RX ADMIN — MUPIROCIN 1 APPLICATION(S): 20 OINTMENT TOPICAL at 05:15

## 2019-05-02 NOTE — PROGRESS NOTE ADULT - SUBJECTIVE AND OBJECTIVE BOX
[place] [facility], [facility]                                   [address] [place] [address]                                                      PHONE: [phone]                                                         FAX: [phone]  [email or web address]  ---------------------------------------------------------------------------------------------------------------------------------    Overnight events/patient complaints: patient seen at bedside. He is feeling well this morning.       No Known Allergies    MEDICATIONS  (STANDING):  amLODIPine   Tablet 5 milliGRAM(s) Oral daily  aspirin  chewable 81 milliGRAM(s) Oral daily  atorvastatin 20 milliGRAM(s) Oral at bedtime  chlorhexidine 0.12% Liquid 15 milliLiter(s) Swish and Spit two times a day  chlorhexidine 4% Liquid 1 Application(s) Topical two times a day  folic acid 1 milliGRAM(s) Oral daily  gemfibrozil 600 milliGRAM(s) Oral two times a day  influenza   Vaccine 0.5 milliLiter(s) IntraMuscular once  metoprolol tartrate 12.5 milliGRAM(s) Oral every 12 hours  mupirocin 2% Ointment 1 Application(s) Topical two times a day  nitroglycerin    2% Ointment 1 Inch(s) Transdermal every 8 hours  pantoprazole    Tablet 40 milliGRAM(s) Oral before breakfast  thiamine 100 milliGRAM(s) Oral daily    MEDICATIONS  (PRN):  LORazepam     Tablet 1 milliGRAM(s) Oral every 2 hours PRN CIWA-Ar score increase by 2 points and a total score of 7 or less      Vital Signs Last 24 Hrs  T(C): 36.2 (02 May 2019 05:13), Max: 36.3 (01 May 2019 19:27)  T(F): 97.2 (02 May 2019 05:13), Max: 97.4 (01 May 2019 19:27)  HR: 64 (02 May 2019 05:13) (51 - 64)  BP: 144/82 (02 May 2019 05:13) (129/89 - 157/68)  BP(mean): --  RR: 16 (02 May 2019 05:13) (16 - 20)  SpO2: 94% (02 May 2019 05:13) (92% - 98%)  ICU Vital Signs Last 24 Hrs  TERI DEAL  I&O's Detail    Drug Dosing Weight  TERI DEAL      PHYSICAL EXAM:  General: Appears well developed, well nourished alert and cooperative.  HEENT: Head; normocephalic, atraumatic.  Eyes: Pupils reactive, cornea wnl.  Neck: Supple, no nodes adenopathy, no NVD or carotid bruit or thyromegaly.  CARDIOVASCULAR: Normal S1 and S2, No murmur, rub, gallop or lift.   LUNGS: No rales, rhonchi or wheeze. Normal breath sounds bilaterally.  ABDOMEN: Soft, nontender without mass or organomegaly. bowel sounds normoactive.  EXTREMITIES: No clubbing, cyanosis or edema. Distal pulses wnl.   SKIN: warm and dry with normal turgor.  NEURO: Alert/oriented x 3/normal motor exam. No pathologic reflexes.    PSYCH: normal affect.        LABS:                        13.2   4.5   )-----------( 176      ( 2019 12:08 )             40.3     05-02    138  |  100  |  19.0  ----------------------------<  91  4.7   |  25.0  |  1.08    Ca    8.8      02 May 2019 06:19  Phos  4.1     05-02  Mg     2.1     05-02    TPro  7.3  /  Alb  3.7  /  TBili  0.8  /  DBili  0.1  /  AST  18  /  ALT  13  /  AlkPhos  67  05-    TERI DEAL  CARDIAC MARKERS ( 01 May 2019 07:54 )  x     / <0.01 ng/mL / x     / x     / x      CARDIAC MARKERS ( 2019 20:06 )  x     / <0.01 ng/mL / x     / x     / x      CARDIAC MARKERS ( 2019 12:08 )  x     / <0.01 ng/mL / 53 U/L / x     / x          PT/INR - ( 2019 12:09 )   PT: 11.9 sec;   INR: 1.03 ratio         PTT - ( 2019 12:09 )  PTT:36.7 sec  Urinalysis Basic - ( 01 May 2019 21:21 )    Color: Yellow / Appearance: Clear / S.010 / pH: x  Gluc: x / Ketone: Negative  / Bili: Negative / Urobili: Negative mg/dL   Blood: x / Protein: 15 mg/dL / Nitrite: Positive   Leuk Esterase: Negative / RBC: 0-2 /HPF / WBC Negative   Sq Epi: x / Non Sq Epi: Occasional / Bacteria: Occasional        RADIOLOGY & ADDITIONAL STUDIES:    INTERPRETATION OF TELEMETRY (personally reviewed): normal sinus rhythm/sinus bradycardia     CARDIAC CATHETERIZATION: CORONARY VESSELS: The coronary circulation is right dominant.  LAD:   --  Proximal LAD: There was a tubular 90 % stenosis. The lesion was  irregularly contoured and calcified.  CX:   --  Mid circumflex: There was a tubular 95 % stenosis. The lesion was  irregularly contoured and ulcerated.  RCA:   --  Distal RCA: There was a 70 % stenosis.      ASSESSMENT AND PLAN:  In summary, TERI DEAL is an 68y Male with past medical history significant for HTN, HLD, daily EtOH use presents with atypical chest pain for the past five days. Cardiac catheterization shows severe three vessel disease.    3V Coronary Disease -- patient being evaluated for possible CABG.  - continue ASA/statin  - increase Lopressor to 25 mg BID  - f/u 2D echo  - f/u CT surgery recommendations    Will follow with you.

## 2019-05-02 NOTE — PROGRESS NOTE ADULT - SUBJECTIVE AND OBJECTIVE BOX
CC:  Patient is a 68y old  Male who presents with a chief complaint of chest pain (01 May 2019 20:07)    HPI:  69 yo male obese with HTN , alcoholism presented to the ED with complaints of left sided chest pain and shortness of breath . He states that has been feeling left chest discomfort since Thursday on off worsening sharp pain with no relieve completly , radiates to his back and left shoulder . associated with dyspnea on exertion heavy work , at present has pain 4/10 , he admits drinking 4-6 beers almost daily in the evening , last drink was last night . Evaluated in the Ed by cardiology m CTA of coronaries done showed CAD plaque , now being admitted for cardiac cath unstable angina . (2019 17:05)    ROS: All review of systems negative unless indicated otherwise below.     Lab Results:  CBC  CBC Full  -  ( 2019 12:08 )  WBC Count : 4.5 K/uL  RBC Count : 5.69 M/uL  Hemoglobin : 13.2 g/dL  Hematocrit : 40.3 %  Platelet Count - Automated : 176 K/uL  Mean Cell Volume : 70.8 fl  Mean Cell Hemoglobin : 23.2 pg  Mean Cell Hemoglobin Concentration : 32.8 g/dL  Auto Neutrophil # : 2.2 K/uL  Auto Lymphocyte # : 1.6 K/uL  Auto Monocyte # : 0.5 K/uL  Auto Eosinophil # : 0.1 K/uL  Auto Basophil # : 0.0 K/uL  Auto Neutrophil % : 50.2 %  Auto Lymphocyte % : 36.2 %  Auto Monocyte % : 10.7 %  Auto Eosinophil % : 2.5 %  Auto Basophil % : 0.2 %    .		Differential:	[] Automated		[] Manual  Chemistry                        13.2   4.5   )-----------( 176      ( 2019 12:08 )             40.3     05-02    138  |  100  |  19.0  ----------------------------<  91  4.7   |  25.0  |  1.08    Ca    8.8      02 May 2019 06:19  Phos  4.1     05-02  Mg     2.1     05-02    TPro  7.3  /  Alb  3.7  /  TBili  0.8  /  DBili  0.1  /  AST  18  /  ALT  13  /  AlkPhos  67  05-01    LIVER FUNCTIONS - ( 01 May 2019 07:54 )  Alb: 3.7 g/dL / Pro: 7.3 g/dL / ALK PHOS: 67 U/L / ALT: 13 U/L / AST: 18 U/L / GGT: x           PT/INR - ( 2019 12:09 )   PT: 11.9 sec;   INR: 1.03 ratio      PTT - ( 2019 12:09 )  PTT:36.7 sec  Urinalysis Basic - ( 01 May 2019 21:21 )    Color: Yellow / Appearance: Clear / S.010 / pH: x  Gluc: x / Ketone: Negative  / Bili: Negative / Urobili: Negative mg/dL   Blood: x / Protein: 15 mg/dL / Nitrite: Positive   Leuk Esterase: Negative / RBC: 0-2 /HPF / WBC Negative   Sq Epi: x / Non Sq Epi: Occasional / Bacteria: Occasional    CARDIAC MARKERS ( 01 May 2019 07:54 )  x     / <0.01 ng/mL / x     / x     / x      CARDIAC MARKERS ( 2019 20:06 )  x     / <0.01 ng/mL / x     / x     / x      CARDIAC MARKERS ( 2019 12:08 )  x     / <0.01 ng/mL / 53 U/L / x     / x        CATH REPORT:   < from: Cardiac Cath Lab - Adult (19 @ 17:22) >  Rodrick Cary MD  INDICATIONS: Unstable angina - CCS3.  HISTORY: Patient presents to ER with UA  CTA with suggestion of stenosis in LAD and high claciumscore  PROCEDURE:  --  Left coronary angiography.  --  Right coronary angiography.  Local anesthetic given. Right radial artery access. Left coronary artery  angiography. The vessel was injected utilizing a catheter. Right coronary  artery angiography. The vessel was injected utilizing a catheter.  RADIATION EXPOSURE: 4.5 min.  CONTRAST GIVEN: Omnipaque 45 ml.  MEDICATIONS GIVEN: Midazolam, 2 mg, IV. Fentanyl, 50 mcg, IV. Heparin, 3000  units, IV. 1% Lidocaine, 10 ml, subcutaneously. Cardene, 1,000 mcg. 0.9NS,  100 ml, IV.  CORONARY VESSELS: The coronary circulation is right dominant.  LAD:   --  Proximal LAD: There was a tubular 90 % stenosis. The lesion was  irregularly contoured and calcified.  CX:   --  Mid circumflex: There was a [id number] % stenosis. The lesion was  irregularly contoured and ulcerated.  RCA:   --  Distal RCA: There was a 70 % stenosis.  COMPLICATIONS: No complications occurred during the cath lab visit.  DIAGNOSTIC RECOMMENDATIONS: Patient with abnormal CTA and UA  Cath reveals significant stenosis of the pLAD, mLCx and dRCA.  Plan: CABG eval with Dr Soto  Discussed with Dr Delvin Jacob ([facility])    < end of copied text >    MEDICATIONS  (STANDING):  amLODIPine   Tablet 5 milliGRAM(s) Oral daily  aspirin  chewable 81 milliGRAM(s) Oral daily  atorvastatin 20 milliGRAM(s) Oral at bedtime  chlorhexidine 0.12% Liquid 15 milliLiter(s) Swish and Spit two times a day  chlorhexidine 4% Liquid 1 Application(s) Topical two times a day  folic acid 1 milliGRAM(s) Oral daily  gemfibrozil 600 milliGRAM(s) Oral two times a day  influenza   Vaccine 0.5 milliLiter(s) IntraMuscular once  metoprolol tartrate 12.5 milliGRAM(s) Oral every 12 hours  mupirocin 2% Ointment 1 Application(s) Topical two times a day  nitroglycerin    2% Ointment 1 Inch(s) Transdermal every 8 hours  pantoprazole    Tablet 40 milliGRAM(s) Oral before breakfast  thiamine 100 milliGRAM(s) Oral daily    MEDICATIONS  (PRN):  LORazepam     Tablet 1 milliGRAM(s) Oral every 2 hours PRN CIWA-Ar score increase by 2 points and a total score of 7 or less    PHYSICAL EXAM:  Vital Signs Last 24 Hrs  T(C): 36.2 (02 May 2019 05:13), Max: 36.3 (01 May 2019 19:27)  T(F): 97.2 (02 May 2019 05:13), Max: 97.4 (01 May 2019 19:27)  HR: 64 (02 May 2019 05:13) (51 - 64)  BP: 144/82 (02 May 2019 05:13) (129/89 - 157/68)  BP(mean): --  RR: 16 (02 May 2019 05:13) (16 - 20)  SpO2: 94% (02 May 2019 05:13) (92% - 98%)    GENERAL: NAD, well-groomed, well-developed  HEAD:  Atraumatic, Normocephalic  NECK: Supple, No JVD  NERVOUS SYSTEM:  Alert & Oriented X3, Good concentration; Motor Strength 5/5 B/L upper and lower extremities, sensation intact  CHEST/LUNG: Clear to auscultation bilaterally, No rales, rhonchi, wheezing, or rubs  HEART: Regular rate and rhythm; s1 and s2 auscultated, No murmurs, rubs, or gallops  ABDOMEN: Soft, Nontender, Nondistended; Bowel sounds present and normoactive.   EXTREMITIES:  2+ Peripheral Pulses, No clubbing, cyanosis, or edema  SKIN: No rashes or lesions  CATH SITE: Right wrist benign s/p cath.  No bleeding, no ecchymosis, no hematoma. Extremity Warm to touch, with palpable distal pulses, and brisk capillary refill.

## 2019-05-02 NOTE — PROGRESS NOTE ADULT - SUBJECTIVE AND OBJECTIVE BOX
Patient is a 68y old  Male who presents with a chief complaint of chest pain   s/p LHC yesterday , 3VD for CAB , being evaluated by Cardiothoracic surgery team   pt is seen examined earlier today , denies chest pain mild shoulder pain present \  d/w family and surgery team         Vital Signs Last 24 Hrs  T(C): 36.3 (02 May 2019 10:59), Max: 36.3 (01 May 2019 19:27)  T(F): 97.4 (02 May 2019 10:59), Max: 97.4 (01 May 2019 19:27)  HR: 57 (02 May 2019 10:59) (51 - 64)  BP: 146/92 (02 May 2019 10:59) (129/89 - 157/68)  BP(mean): --  RR: 16 (02 May 2019 10:59) (16 - 20)  SpO2: 95% (02 May 2019 10:59) (92% - 98%)    GENERAL: NAD, well-groomed, obese   CHEST/LUNG: Clear to auscultation  bilaterally; No rales, rhonchi, wheezing  HEART: Regular rate and rhythm; s1 /s2 No murmurs  ABDOMEN: Soft, Nontender, Nondistended; Bowel sounds present  EXTREMITIES: No clubbing, cyanosis, or edema    05-02    138  |  100  |  19.0  ----------------------------<  91  4.7   |  25.0  |  1.08    Ca    8.8      02 May 2019 06:19  Phos  4.1     05-02  Mg     2.1     05-02    TPro  7.3  /  Alb  3.7  /  TBili  0.8  /  DBili  0.1  /  AST  18  /  ALT  13  /  AlkPhos  67  05-01

## 2019-05-02 NOTE — PROGRESS NOTE ADULT - ASSESSMENT
67 yo male with alcoholism , hyperlipidemia, HTN obese admitted for ACS , CTA of coronaries positive LAD lesion , CAD   [facility] today       1- ACS - CAD 3VD   cardiothoracic surgery on board for CABG   pain si improving   cont metoprolol increase the dose per cardio   aspirin , statin   2- HTN   controlled stable   3- Alcoholism   no withdrawal  symptoms   cont thiamine , folic acid     4- Hyperlipidemia   cont lopid and statin

## 2019-05-02 NOTE — PROGRESS NOTE ADULT - ASSESSMENT
THis is a 69 y/o M with a h/o ETOH with recent CP found to have CAD on CATH     PLAN  Neuro:   Pulm: Wean off supplemental oxygen. Daily CXR.   Cardio: Continue Lipitor. (BB) Antiplatelets:  GI: Tolerating diet. Continue stool softeners. Protonix for GI Prophylaxis.  Renal: Keep negative fluid balance. (Diuretics) Trend BUN/Cr. Supplement electrolytes prn.   :   Vasc: Lovenox/SCDs for DVT prophylaxis  Heme: Stable H/H. Trend CBC daily.   Endo:  ID:   Therapy: PT daily as tolerated.  Tubes:  Disposition: Preop for xxx on xxx  Discussed with Cardiothoracic Team at AM rounds. THis is a 69 y/o M with a h/o ETOH with recent CP found to have CAD on CATH     PLAN  Pulm: PFTs pending  Cardio: Continue Lipitor, lopressor, nitrobid Antiplatelets: On Aspirin 81 mg  GI: Tolerating diet. Continue stool softeners. Protonix for GI Prophylaxis.  Renal: Keep negative fluid balance. (Diuretics) Trend BUN/Cr. Supplement electrolytes prn.   : Patient self caths about once every 10-14 days if he feels he "doesn't empty enough" Consider uro consult. Urine culture ordered for borderline UA  Vasc: Lovenox/SCDs for DVT prophylaxis  Heme: Stable H/H. Trend CBC daily.   Endo: A1C 5.7  Disposition: Preop for CABG on Tuesday  Will still need vein mapping  Discussed with Cardiothoracic Team at AM rounds.

## 2019-05-02 NOTE — PROGRESS NOTE ADULT - ASSESSMENT
This is a 67 yo male obese with HTN, ETOH (4-6 beers daily) that presented with unstable angina CCS3 and recent CT angio of coronary arteries revealing CAD. Pt now s/p cath with pLAD 90%, mLCX 95%, RCA 70% consistent with TVD. Patient did not undergo intervention and was transported to floor and is awaiting CTS consult with Brittany for CABG.

## 2019-05-02 NOTE — PROGRESS NOTE ADULT - SUBJECTIVE AND OBJECTIVE BOX
Subjective: "I have a little pain" NAD lying in bed, states that he "forgets about it sometimes" No SOB, N/V.    VITAL SIGNS  Vital Signs Last 24 Hrs  T(C): 37.1 (19 @ 16:20), Max: 37.1 (19 @ 16:20)  T(F): 98.8 (19 @ 16:20), Max: 98.8 (19 @ 16:20)  HR: 66 (19 @ 16:20) (52 - 66)  BP: 130/83 (19 @ 16:20) (129/89 - 146/92)  RR: 16 (19 @ 16:20) (16 - 18)  SpO2: 100% (19 @ 16:20) (94% - 100%)     Telemetry/Alarms:  SB  LVEF: 50%    MEDICATIONS  amLODIPine   Tablet 5 milliGRAM(s) Oral daily  aspirin  chewable 81 milliGRAM(s) Oral daily  atorvastatin 20 milliGRAM(s) Oral at bedtime  chlorhexidine 0.12% Liquid 15 milliLiter(s) Swish and Spit two times a day  chlorhexidine 4% Liquid 1 Application(s) Topical two times a day  enoxaparin Injectable 40 milliGRAM(s) SubCutaneous daily  folic acid 1 milliGRAM(s) Oral daily  gemfibrozil 600 milliGRAM(s) Oral two times a day  influenza   Vaccine 0.5 milliLiter(s) IntraMuscular once  LORazepam     Tablet 1 milliGRAM(s) Oral every 2 hours PRN  metoprolol tartrate 25 milliGRAM(s) Oral two times a day  mupirocin 2% Ointment 1 Application(s) Topical two times a day  nitroglycerin    2% Ointment 1 Inch(s) Transdermal every 8 hours  pantoprazole    Tablet 40 milliGRAM(s) Oral before breakfast  thiamine 100 milliGRAM(s) Oral daily      PHYSICAL EXAM  General: well nourished, well developed, no acute distress  Neurology: alert and oriented x 3, nonfocal, no gross deficits  Respiratory: clear to auscultation bilaterally  CV: regular rate and rhythm, normal S1, S2  Abdomen: soft, nontender, nondistended, positive bowel sounds  Extremities: warm, well perfused. no edema. + DP pulses        Weights:  Daily     Daily Weight in k.9 (02 May 2019 05:12)  Admit Wt: Drug Dosing Weight  Height (cm): 175.26 (2019 11:19)  Weight (kg): 104.3 (2019 11:19)  BMI (kg/m2): 34 (2019 11:19)  BSA (m2): 2.19 (2019 11:19)    LABS      138  |  100  |  19.0  ----------------------------<  91  4.7   |  25.0  |  1.08    Ca    8.8      02 May 2019 06:19  Phos  4.1       Mg     2.1         TPro  7.3  /  Alb  3.7  /  TBili  0.8  /  DBili  0.1  /  AST  18  /  ALT  13  /  AlkPhos  67  05-                      Prealbumin, Serum: 26 mg/dL ( @ 06:19)    Hemoglobin A1C, Whole Blood: 5.7 % ( @ 06:19)      Last CXR: < from: Xray Chest 2 Views PA/Lat (19 @ 12:47) >    FINDINGS:       The lungs are of equal and symmetric in aeration. The bronchovascular   markings are unremarkable.  There is no acute parenchymal consolidation.    There is no pleural effusion. The cardiomediastinal silhouette is within   normal limits.  There isno acute osseous finding.    IMPRESSION:  Clear lungs.    < end of copied text >      Last EKG: < from: 12 Lead ECG (19 @ 09:47) >      < end of copied text >    < from: US Duplex Carotid Arteries Complete, Bilateral (19 @ 21:44) >    IMPRESSION:    Mild plaque in the carotid bulbs with less than 50% internal carotid   artery stenosis bilaterally.          < end of copied text >      Echo:< from: TTE Echo Complete w/Doppler (19 @ 11:49) >    Summary:   1. Left ventricular ejection fraction, by visual estimation, is 50 to   55%.   2. Normal global left ventricular systolic function.   3. Apical anterior segment, apical inferior segment, and apex are   abnormal as described above.   4. Spectral Doppler shows impaired relaxation pattern of left   ventricular myocardial filling (Grade I diastolic dysfunction).   5. There is mild concentric left ventricular hypertrophy.   6. Endocardial visualization was enhanced with intravenous echo contrast.    MD Mat Electronically signed on 2019 at 3:33:16 PM    < end of copied text >      Cath: < from: Cardiac Cath Lab - Adult (19 @ 17:22) >  COMPLICATIONS: No complications occurred during the cath lab visit.  DIAGNOSTIC RECOMMENDATIONS: Patient with abnormal CTA and UA  Cath reveals significant stenosis of the pLAD, mLCx and dRCA.  Plan: CABG eval with Dr Soto  Discussed with Dr Delvin Jacob ([facility])  Prepared and signed by  Patricio Simpson MD  Signed 2019 18:03:00    < end of copied text >      PAST MEDICAL & SURGICAL HISTORY:  ETOH abuse  Esophageal varices  Hyperlipidemia  Hypertension  No significant past surgical history

## 2019-05-03 LAB
ABO RH CONFIRMATION: SIGNIFICANT CHANGE UP
ANION GAP SERPL CALC-SCNC: 14 MMOL/L — SIGNIFICANT CHANGE UP (ref 5–17)
BLD GP AB SCN SERPL QL: SIGNIFICANT CHANGE UP
BUN SERPL-MCNC: 20 MG/DL — SIGNIFICANT CHANGE UP (ref 8–20)
CALCIUM SERPL-MCNC: 8.9 MG/DL — SIGNIFICANT CHANGE UP (ref 8.6–10.2)
CHLORIDE SERPL-SCNC: 101 MMOL/L — SIGNIFICANT CHANGE UP (ref 98–107)
CO2 SERPL-SCNC: 24 MMOL/L — SIGNIFICANT CHANGE UP (ref 22–29)
CREAT SERPL-MCNC: 1.09 MG/DL — SIGNIFICANT CHANGE UP (ref 0.5–1.3)
GLUCOSE SERPL-MCNC: 104 MG/DL — SIGNIFICANT CHANGE UP (ref 70–115)
HCT VFR BLD CALC: 39.9 % — LOW (ref 42–52)
HGB BLD-MCNC: 13.5 G/DL — LOW (ref 14–18)
MAGNESIUM SERPL-MCNC: 2 MG/DL — SIGNIFICANT CHANGE UP (ref 1.8–2.6)
MCHC RBC-ENTMCNC: 23.6 PG — LOW (ref 27–31)
MCHC RBC-ENTMCNC: 33.8 G/DL — SIGNIFICANT CHANGE UP (ref 32–36)
MCV RBC AUTO: 69.6 FL — LOW (ref 80–94)
NT-PROBNP SERPL-SCNC: 25 PG/ML — SIGNIFICANT CHANGE UP (ref 0–300)
PLATELET # BLD AUTO: 202 K/UL — SIGNIFICANT CHANGE UP (ref 150–400)
POTASSIUM SERPL-MCNC: 4.1 MMOL/L — SIGNIFICANT CHANGE UP (ref 3.5–5.3)
POTASSIUM SERPL-SCNC: 4.1 MMOL/L — SIGNIFICANT CHANGE UP (ref 3.5–5.3)
RBC # BLD: 5.73 M/UL — SIGNIFICANT CHANGE UP (ref 4.6–6.2)
RBC # FLD: 16.1 % — HIGH (ref 11–15.6)
SODIUM SERPL-SCNC: 139 MMOL/L — SIGNIFICANT CHANGE UP (ref 135–145)
TROPONIN T SERPL-MCNC: <0.01 NG/ML — SIGNIFICANT CHANGE UP (ref 0–0.06)
TYPE + AB SCN PNL BLD: SIGNIFICANT CHANGE UP
WBC # BLD: 5.7 K/UL — SIGNIFICANT CHANGE UP (ref 4.8–10.8)
WBC # FLD AUTO: 5.7 K/UL — SIGNIFICANT CHANGE UP (ref 4.8–10.8)

## 2019-05-03 PROCEDURE — 99232 SBSQ HOSP IP/OBS MODERATE 35: CPT

## 2019-05-03 PROCEDURE — 76705 ECHO EXAM OF ABDOMEN: CPT | Mod: 26

## 2019-05-03 RX ORDER — TAMSULOSIN HYDROCHLORIDE 0.4 MG/1
0.4 CAPSULE ORAL AT BEDTIME
Qty: 0 | Refills: 0 | Status: DISCONTINUED | OUTPATIENT
Start: 2019-05-03 | End: 2019-05-07

## 2019-05-03 RX ORDER — ISOSORBIDE MONONITRATE 60 MG/1
30 TABLET, EXTENDED RELEASE ORAL ONCE
Qty: 0 | Refills: 0 | Status: COMPLETED | OUTPATIENT
Start: 2019-05-03 | End: 2019-05-03

## 2019-05-03 RX ORDER — ISOSORBIDE MONONITRATE 60 MG/1
30 TABLET, EXTENDED RELEASE ORAL DAILY
Qty: 0 | Refills: 0 | Status: DISCONTINUED | OUTPATIENT
Start: 2019-05-03 | End: 2019-05-03

## 2019-05-03 RX ORDER — ISOSORBIDE MONONITRATE 60 MG/1
60 TABLET, EXTENDED RELEASE ORAL DAILY
Qty: 0 | Refills: 0 | Status: DISCONTINUED | OUTPATIENT
Start: 2019-05-04 | End: 2019-05-07

## 2019-05-03 RX ADMIN — Medication 1 MILLIGRAM(S): at 11:53

## 2019-05-03 RX ADMIN — Medication 600 MILLIGRAM(S): at 17:32

## 2019-05-03 RX ADMIN — Medication 81 MILLIGRAM(S): at 11:54

## 2019-05-03 RX ADMIN — ISOSORBIDE MONONITRATE 30 MILLIGRAM(S): 60 TABLET, EXTENDED RELEASE ORAL at 11:53

## 2019-05-03 RX ADMIN — Medication 100 MILLIGRAM(S): at 11:53

## 2019-05-03 RX ADMIN — Medication 25 MILLIGRAM(S): at 05:28

## 2019-05-03 RX ADMIN — Medication 1 INCH(S): at 05:28

## 2019-05-03 RX ADMIN — PANTOPRAZOLE SODIUM 40 MILLIGRAM(S): 20 TABLET, DELAYED RELEASE ORAL at 05:28

## 2019-05-03 RX ADMIN — Medication 1 INCH(S): at 11:56

## 2019-05-03 RX ADMIN — Medication 1 INCH(S): at 17:29

## 2019-05-03 RX ADMIN — MUPIROCIN 1 APPLICATION(S): 20 OINTMENT TOPICAL at 17:32

## 2019-05-03 RX ADMIN — ISOSORBIDE MONONITRATE 30 MILLIGRAM(S): 60 TABLET, EXTENDED RELEASE ORAL at 16:44

## 2019-05-03 RX ADMIN — TAMSULOSIN HYDROCHLORIDE 0.4 MILLIGRAM(S): 0.4 CAPSULE ORAL at 22:53

## 2019-05-03 RX ADMIN — CHLORHEXIDINE GLUCONATE 15 MILLILITER(S): 213 SOLUTION TOPICAL at 17:32

## 2019-05-03 RX ADMIN — CHLORHEXIDINE GLUCONATE 15 MILLILITER(S): 213 SOLUTION TOPICAL at 05:28

## 2019-05-03 RX ADMIN — ATORVASTATIN CALCIUM 20 MILLIGRAM(S): 80 TABLET, FILM COATED ORAL at 22:53

## 2019-05-03 RX ADMIN — MUPIROCIN 1 APPLICATION(S): 20 OINTMENT TOPICAL at 05:28

## 2019-05-03 RX ADMIN — Medication 25 MILLIGRAM(S): at 17:32

## 2019-05-03 RX ADMIN — ENOXAPARIN SODIUM 40 MILLIGRAM(S): 100 INJECTION SUBCUTANEOUS at 11:54

## 2019-05-03 RX ADMIN — AMLODIPINE BESYLATE 5 MILLIGRAM(S): 2.5 TABLET ORAL at 05:28

## 2019-05-03 RX ADMIN — Medication 600 MILLIGRAM(S): at 05:28

## 2019-05-03 NOTE — PROGRESS NOTE ADULT - SUBJECTIVE AND OBJECTIVE BOX
follow up for CAD , 3VD with unstable angina   pt is seen examined   he is feeling well , pain improved   no overnight events   cardiology and cardiothoracic surgery team is appreciated   he admits having problems with urination and had retention in the past seen by DR Brandt       Vital Signs Last 24 Hrs  T(C): 36.6 (03 May 2019 05:33), Max: 37.1 (02 May 2019 16:20)  T(F): 97.9 (03 May 2019 05:33), Max: 98.8 (02 May 2019 16:20)  HR: 60 (03 May 2019 05:33) (57 - 66)  BP: 129/83 (03 May 2019 05:33) (129/83 - 146/92)  BP(mean): --  RR: 20 (03 May 2019 05:33) (16 - 20)  SpO2: 100% (03 May 2019 05:33) (95% - 100%)    GENERAL: NAD, well-groomed, obese   CHEST/LUNG: Clear to auscultation  bilaterally; No rales, rhonchi, wheezing  HEART: Regular rate and rhythm; s1 /s2 No murmurs  ABDOMEN: Soft, Nontender, Nondistended; Bowel sounds present  EXTREMITIES: No clubbing, cyanosis, or edema                          13.5   5.7   )-----------( 202      ( 03 May 2019 06:02 )             39.9   05-03    139  |  101  |  20.0  ----------------------------<  104  4.1   |  24.0  |  1.09    Ca    8.9      03 May 2019 06:02  Phos  4.1     05-02  Mg     2.0     05-03

## 2019-05-03 NOTE — PROGRESS NOTE ADULT - ASSESSMENT
THis is a 67 y/o M with a h/o ETOH with recent CP found to have CAD on CATH     PLAN  Pulm: PFTs pending  Cardio: Continue Lipitor, lopressor, nitrobid Antiplatelets: On Aspirin 81 mg  GI: Tolerating diet. Continue stool softeners. Protonix for GI Prophylaxis.  Renal: Keep negative fluid balance. (Diuretics) Trend BUN/Cr. Supplement electrolytes prn.   : Patient self caths about once every 10-14 days if he feels he "doesn't empty enough" Consider uro consult. Urine culture ordered for borderline UA  Vasc: Lovenox/SCDs for DVT prophylaxis  Heme: Stable H/H. Trend CBC daily.   Endo: A1C 5.7  Disposition: Preop for CABG on Tuesday  Will still need vein mapping  Discussed with Cardiothoracic Team at AM rounds.

## 2019-05-03 NOTE — PROGRESS NOTE ADULT - SUBJECTIVE AND OBJECTIVE BOX
[place] [facility] - [facility], THE HEART CENTER                                   [address] [place] [address]                                                      PHONE: [phone]                                                         FAX: [phone]  [email or web address]  ---------------------------------------------------------------------------------------------------------------------------------    Overnight events/patient complaints:  Pt feels well    No Known Allergies    MEDICATIONS  (STANDING):  amLODIPine   Tablet 5 milliGRAM(s) Oral daily  aspirin  chewable 81 milliGRAM(s) Oral daily  atorvastatin 20 milliGRAM(s) Oral at bedtime  chlorhexidine 0.12% Liquid 15 milliLiter(s) Swish and Spit two times a day  enoxaparin Injectable 40 milliGRAM(s) SubCutaneous daily  folic acid 1 milliGRAM(s) Oral daily  gemfibrozil 600 milliGRAM(s) Oral two times a day  metoprolol tartrate 25 milliGRAM(s) Oral two times a day  mupirocin 2% Ointment 1 Application(s) Topical two times a day  nitroglycerin    2% Ointment 1 Inch(s) Transdermal every 8 hours  pantoprazole    Tablet 40 milliGRAM(s) Oral before breakfast  tamsulosin 0.4 milliGRAM(s) Oral at bedtime  thiamine 100 milliGRAM(s) Oral daily    MEDICATIONS  (PRN):  LORazepam     Tablet 1 milliGRAM(s) Oral every 2 hours PRN CIWA-Ar score increase by 2 points and a total score of 7 or less      Vital Signs Last 24 Hrs  T(C): 36.6 (03 May 2019 05:33), Max: 37.1 (02 May 2019 16:20)  T(F): 97.9 (03 May 2019 05:33), Max: 98.8 (02 May 2019 16:20)  HR: 60 (03 May 2019 05:33) (57 - 66)  BP: 129/83 (03 May 2019 05:33) (129/83 - 146/92)  BP(mean): --  RR: 20 (03 May 2019 05:33) (16 - 20)  SpO2: 100% (03 May 2019 05:33) (95% - 100%)  ICU Vital Signs Last 24 Hrs  TERI DEAL  I&O's Detail    Drug Dosing Weight  TERI DEAL      PHYSICAL EXAM:  General: Appears well developed, well nourished alert and cooperative.  HEENT: Head; normocephalic, atraumatic.  Eyes: Pupils reactive, cornea wnl.  Neck: Supple, no nodes adenopathy, no NVD or carotid bruit or thyromegaly.  CARDIOVASCULAR: Normal S1 and S2, No murmur, rub, gallop or lift.   LUNGS: No rales, rhonchi or wheeze. Normal breath sounds bilaterally.  ABDOMEN: Soft, nontender without mass or organomegaly. bowel sounds normoactive.  EXTREMITIES: No clubbing, cyanosis or edema. Distal pulses wnl.   SKIN: warm and dry with normal turgor.  NEURO: Alert/oriented x 3/normal motor exam. No pathologic reflexes.    PSYCH: normal affect.        LABS:                        13.5   5.7   )-----------( 202      ( 03 May 2019 06:02 )             39.9     05-03    139  |  101  |  20.0  ----------------------------<  104  4.1   |  24.0  |  1.09    Ca    8.9      03 May 2019 06:02  Phos  4.1     05-02  Mg     2.0     05-03      TERI DEAL  CARDIAC MARKERS ( 03 May 2019 06:02 )  x     / <0.01 ng/mL / x     / x     / x            Urinalysis Basic - ( 01 May 2019 21:21 )    Color: Yellow / Appearance: Clear / S.010 / pH: x  Gluc: x / Ketone: Negative  / Bili: Negative / Urobili: Negative mg/dL   Blood: x / Protein: 15 mg/dL / Nitrite: Positive   Leuk Esterase: Negative / RBC: 0-2 /HPF / WBC Negative   Sq Epi: x / Non Sq Epi: Occasional / Bacteria: Occasional        RADIOLOGY & ADDITIONAL STUDIES:    INTERPRETATION OF TELEMETRY (personally reviewed): no events     ECHO: < from: TTE Echo Complete w/Doppler (19 @ 11:49) >  Summary:   1. Left ventricular ejection fraction, by visual estimation, is 50 to   55%.   2. Normal global left ventricular systolic function.   3. Apical anterior segment, apical inferior segment, and apex are   abnormal as described above.   4. Spectral Doppler shows impaired relaxation pattern of left   ventricular myocardial filling (Grade I diastolic dysfunction).   5. There is mild concentric left ventricular hypertrophy.   6. Endocardial visualization was enhanced with intravenous echo contrast.    MD Mat Electronically signed on 2019 at 3:33:16 PM    < end of copied text >         CARDIAC CATHETERIZATION: < from: Cardiac Cath Lab - Adult (19 @ 17:22) >  CORONARY VESSELS: The coronary circulation is right dominant.  LAD:   --  Proximal LAD: There was a tubular 90 % stenosis. The lesion was  irregularly contoured and calcified.  CX:   --  Mid circumflex: There was a isaemfx41 % stenosis. The lesion was  irregularly contoured and ulcerated.  RCA:   --  Distal RCA: There was a 70 % stenosis.  COMPLICATIONS: No complications occurred during the cath lab visit.  DIAGNOSTIC RECOMMENDATIONS: Patient with abnormal CTA and UA  Cath reveals significant stenosis of the pLAD, mLCx and dRCA.  Plan: CABG eval with Dr Soto  Discussed with Dr Delvin Jacob ([facility])  Prepared and signed by  Patricio Simpson MD  Signed 2019 18:03:00    < end of copied text >      ASSESSMENT AND PLAN:  In summary, TERI DEAL is an 68y Male with past medical history significant for ETOH abuse, found to have 3V CAD pending CABG Tuesday.    1) Cw ASA, Statin  2) cw metoprolol and imdur  3) CIWA protocol  4) CABG Tues

## 2019-05-03 NOTE — PROGRESS NOTE ADULT - ASSESSMENT
67 yo male with alcoholism , hyperlipidemia, HTN obese admitted for ACS , CTA of coronaries positive LAD lesion , CAD s/p cardiac cath with 3VD , seen by thoracic surgery team for CABG likely early next weak , Pt is with c/o difficulties urinating and retention in the past       1- CAD with unstable angina   s/p [facility] with  3VD   cardiothoracic surgery on board for CABG likely  next week   cont metoprolol and imdur , aspirin   stop amlodipine BP soft   restart as needed     2- HTN controlled soft   monitor on current medication     3- Alcoholism   no withdrawal  symptoms on CIWA protocol  cont folic acid thiamine   US of abd no ascities   cont thiamine , folic acid     4- Hyperlipidemia   cont lopid and statin   5- urinary symptoms    called   bladder scan ordered   will start flomax 0.4 mg qhs

## 2019-05-03 NOTE — PROGRESS NOTE ADULT - SUBJECTIVE AND OBJECTIVE BOX
Subjective: Pt resting in bed comfortably. No acute events overnight. Pt denies any heacdache, syncope, palpitations, SOB, difficulty breathing, nausea, vomiting, fevers, chills, abdominal discomfort, urinary retention.   Pt endorses having some chest pain earlier today when he was walking with the nurse. But when he went back to bed his chest pain subsided.     T(C): 37.4 (05-03-19 @ 10:05), Max: 37.4 (05-03-19 @ 10:05)  HR: 58 (05-03-19 @ 10:05) (58 - 66)  BP: 135/83 (05-03-19 @ 10:05) (129/83 - 138/82)  ABP: --  ABP(mean): --  RR: 20 (05-03-19 @ 10:05) (16 - 20)  SpO2: 93% (05-03-19 @ 10:05) (93% - 100%)  Wt(kg): --  CVP(mm Hg): --  CO: --  CI: --  PA: --       I&O's Detail      LABS: All Lab data reviewed and analyzed                        13.5   5.7   )-----------( 202      ( 03 May 2019 06:02 )             39.9     05-03    139  |  101  |  20.0  ----------------------------<  104  4.1   |  24.0  |  1.09    Ca    8.9      03 May 2019 06:02  Phos  4.1     05-02  Mg     2.0     05-03        CARDIAC MARKERS ( 03 May 2019 06:02 )  CKx     / CKMBx     / Troponin T<0.01 ng/mL /        CAPILLARY BLOOD GLUCOSE               RADIOLOGY: - Reviewed and analyzed   CXR: pending    HOSPITAL MEDICATIONS: All medications reviewed and analyzed  MEDICATIONS  (STANDING):  aspirin  chewable 81 milliGRAM(s) Oral daily  atorvastatin 20 milliGRAM(s) Oral at bedtime  chlorhexidine 0.12% Liquid 15 milliLiter(s) Swish and Spit two times a day  enoxaparin Injectable 40 milliGRAM(s) SubCutaneous daily  folic acid 1 milliGRAM(s) Oral daily  gemfibrozil 600 milliGRAM(s) Oral two times a day  isosorbide   mononitrate ER Tablet (IMDUR) 30 milliGRAM(s) Oral daily  metoprolol tartrate 25 milliGRAM(s) Oral two times a day  mupirocin 2% Ointment 1 Application(s) Topical two times a day  pantoprazole    Tablet 40 milliGRAM(s) Oral before breakfast  tamsulosin 0.4 milliGRAM(s) Oral at bedtime    MEDICATIONS  (PRN):  LORazepam     Tablet 1 milliGRAM(s) Oral every 2 hours PRN CIWA-Ar score increase by 2 points and a total score of 7 or less          Lines:     Physical Exam  Neuro: A+O x 3, non-focal, speech clear and intact  HEENT:  NCAT, PERRL, EOMI. No conjuctival edema or iIcterus, no thrush.  No ETT or NGT/OGT  Neck:  ROM intact, no JVD, no nodes or masses palpable, trachea midline, no tracheostomy  Pulm: CTA, good air entry, equal bilaterally, no rales/rhonchi/wheezing, no accessory muscle use noted  CV: RRR, S1, S2. No murmurs, rubs, or gallops noted.  Abd: +BSx4. Soft, nontender, nondistended.  Ext: PARIKH x 4, no edema, no cyanosis or clubbing, distal motor/neuro/circ intact  Skin: warm, dry, no rashes          Case including assessment/plan of care discussed with   CT surgery attending.  Critical Care time:  25    minutes of noncontinuos critical care time spent evaluating/treating, reviewing imaging, labs, discussing case with multidisciplinary team, discussing plans/goals of care with patient/family to prevent further life threatening depreciation of the patient's condition. Non-inclusive is procedure time.       68yMale with [facility]         PAST MEDICAL & SURGICAL HISTORY:  ETOH abuse  Esophageal varices  Hyperlipidemia  Hypertension  No significant past surgical history

## 2019-05-04 DIAGNOSIS — N35.919 UNSPECIFIED URETHRAL STRICTURE, MALE, UNSPECIFIED SITE: ICD-10-CM

## 2019-05-04 DIAGNOSIS — N40.1 BENIGN PROSTATIC HYPERPLASIA WITH LOWER URINARY TRACT SYMPTOMS: ICD-10-CM

## 2019-05-04 PROCEDURE — 99232 SBSQ HOSP IP/OBS MODERATE 35: CPT

## 2019-05-04 PROCEDURE — 93010 ELECTROCARDIOGRAM REPORT: CPT

## 2019-05-04 RX ORDER — ATORVASTATIN CALCIUM 80 MG/1
40 TABLET, FILM COATED ORAL AT BEDTIME
Qty: 0 | Refills: 0 | Status: DISCONTINUED | OUTPATIENT
Start: 2019-05-04 | End: 2019-05-07

## 2019-05-04 RX ORDER — NYSTATIN CREAM 100000 [USP'U]/G
1 CREAM TOPICAL
Qty: 0 | Refills: 0 | Status: DISCONTINUED | OUTPATIENT
Start: 2019-05-04 | End: 2019-05-07

## 2019-05-04 RX ORDER — CHLORHEXIDINE GLUCONATE 213 G/1000ML
1 SOLUTION TOPICAL
Qty: 0 | Refills: 0 | Status: DISCONTINUED | OUTPATIENT
Start: 2019-05-05 | End: 2019-05-07

## 2019-05-04 RX ADMIN — CHLORHEXIDINE GLUCONATE 15 MILLILITER(S): 213 SOLUTION TOPICAL at 17:06

## 2019-05-04 RX ADMIN — PANTOPRAZOLE SODIUM 40 MILLIGRAM(S): 20 TABLET, DELAYED RELEASE ORAL at 06:09

## 2019-05-04 RX ADMIN — TAMSULOSIN HYDROCHLORIDE 0.4 MILLIGRAM(S): 0.4 CAPSULE ORAL at 22:05

## 2019-05-04 RX ADMIN — ATORVASTATIN CALCIUM 40 MILLIGRAM(S): 80 TABLET, FILM COATED ORAL at 22:05

## 2019-05-04 RX ADMIN — Medication 25 MILLIGRAM(S): at 06:09

## 2019-05-04 RX ADMIN — NYSTATIN CREAM 1 APPLICATION(S): 100000 CREAM TOPICAL at 17:07

## 2019-05-04 RX ADMIN — ENOXAPARIN SODIUM 40 MILLIGRAM(S): 100 INJECTION SUBCUTANEOUS at 22:05

## 2019-05-04 RX ADMIN — Medication 600 MILLIGRAM(S): at 06:09

## 2019-05-04 RX ADMIN — Medication 25 MILLIGRAM(S): at 17:09

## 2019-05-04 RX ADMIN — Medication 1 MILLIGRAM(S): at 10:57

## 2019-05-04 RX ADMIN — Medication 600 MILLIGRAM(S): at 17:09

## 2019-05-04 RX ADMIN — CHLORHEXIDINE GLUCONATE 15 MILLILITER(S): 213 SOLUTION TOPICAL at 06:09

## 2019-05-04 RX ADMIN — ISOSORBIDE MONONITRATE 60 MILLIGRAM(S): 60 TABLET, EXTENDED RELEASE ORAL at 10:57

## 2019-05-04 RX ADMIN — MUPIROCIN 1 APPLICATION(S): 20 OINTMENT TOPICAL at 17:07

## 2019-05-04 RX ADMIN — MUPIROCIN 1 APPLICATION(S): 20 OINTMENT TOPICAL at 06:09

## 2019-05-04 RX ADMIN — Medication 81 MILLIGRAM(S): at 10:57

## 2019-05-04 NOTE — PROGRESS NOTE ADULT - SUBJECTIVE AND OBJECTIVE BOX
[place] [facility] - [facility], THE HEART CENTER                                   [address] [place] [address]                                                      PHONE: [phone]                                                         FAX: [phone]  [email or web address]  ---------------------------------------------------------------------------------------------------------------------------------    Overnight events/patient complaints:  pt feels well and had mild CP with ambulation yesterday    No Known Allergies    MEDICATIONS  (STANDING):  aspirin  chewable 81 milliGRAM(s) Oral daily  atorvastatin 20 milliGRAM(s) Oral at bedtime  chlorhexidine 0.12% Liquid 15 milliLiter(s) Swish and Spit two times a day  enoxaparin Injectable 40 milliGRAM(s) SubCutaneous daily  folic acid 1 milliGRAM(s) Oral daily  gemfibrozil 600 milliGRAM(s) Oral two times a day  isosorbide   mononitrate ER Tablet (IMDUR) 60 milliGRAM(s) Oral daily  metoprolol tartrate 25 milliGRAM(s) Oral two times a day  mupirocin 2% Ointment 1 Application(s) Topical two times a day  pantoprazole    Tablet 40 milliGRAM(s) Oral before breakfast  tamsulosin 0.4 milliGRAM(s) Oral at bedtime    MEDICATIONS  (PRN):  LORazepam     Tablet 1 milliGRAM(s) Oral every 2 hours PRN CIWA-Ar score increase by 2 points and a total score of 7 or less      Vital Signs Last 24 Hrs  T(C): 36.4 (04 May 2019 06:06), Max: 37.4 (03 May 2019 10:05)  T(F): 97.6 (04 May 2019 06:06), Max: 99.3 (03 May 2019 10:05)  HR: 56 (04 May 2019 06:06) (56 - 65)  BP: 102/70 (04 May 2019 06:06) (102/70 - 178/74)  BP(mean): --  RR: 16 (04 May 2019 06:06) (16 - 20)  SpO2: 98% (04 May 2019 06:06) (93% - 100%)  ICU Vital Signs Last 24 Hrs  TERI DEAL  I&O's Detail    03 May 2019 07:01  -  04 May 2019 07:00  --------------------------------------------------------  IN:  Total IN: 0 mL    OUT:    Voided: 1225 mL  Total OUT: 1225 mL    Total NET: -1225 mL        Drug Dosing Weight  TERI DEAL      PHYSICAL EXAM:  General: Appears well developed, well nourished alert and cooperative.  HEENT: Head; normocephalic, atraumatic.  Eyes: Pupils reactive, cornea wnl.  Neck: Supple, no nodes adenopathy, no NVD or carotid bruit or thyromegaly.  CARDIOVASCULAR: Normal S1 and S2, No murmur, rub, gallop or lift.   LUNGS: No rales, rhonchi or wheeze. Normal breath sounds bilaterally.  ABDOMEN: Soft, nontender without mass or organomegaly. bowel sounds normoactive.  EXTREMITIES: No clubbing, cyanosis or edema. Distal pulses wnl.   SKIN: warm and dry with normal turgor.  NEURO: Alert/oriented x 3/normal motor exam. No pathologic reflexes.    PSYCH: normal affect.        LABS:                        13.5   5.7   )-----------( 202      ( 03 May 2019 06:02 )             39.9     05-03    139  |  101  |  20.0  ----------------------------<  104  4.1   |  24.0  |  1.09    Ca    8.9      03 May 2019 06:02  Mg     2.0     05-03      TERI DEAL  CARDIAC MARKERS ( 03 May 2019 06:02 )  x     / <0.01 ng/mL / x     / x     / x                RADIOLOGY & ADDITIONAL STUDIES:    INTERPRETATION OF TELEMETRY (personally reviewed): No events     ECHO: < from: TTE Echo Complete w/Doppler (05.02.19 @ 11:49) >  Summary:   1. Left ventricular ejection fraction, by visual estimation, is 50 to   55%.   2. Normal global left ventricular systolic function.   3. Apical anterior segment, apical inferior segment, and apex are   abnormal as described above.   4. Spectral Doppler shows impaired relaxation pattern of left   ventricular myocardial filling (Grade I diastolic dysfunction).   5. There is mild concentric left ventricular hypertrophy.   6. Endocardial visualization was enhanced with intravenous echo contrast.    MD Mat Electronically signed on 5/2/2019 at 3:33:16 PM    < end of copied text >         CARDIAC CATHETERIZATION:  < from: Cardiac Cath Lab - Adult (05.01.19 @ 17:22) >  CORONARY VESSELS: The coronary circulation is right dominant.  LAD:   --  Proximal LAD: There was a tubular 90 % stenosis. The lesion was  irregularly contoured and calcified.  CX:   --  Mid circumflex: There was a mxyqqzh16 % stenosis. The lesion was  irregularly contoured and ulcerated.  RCA:   --  Distal RCA: There was a 70 % stenosis.  COMPLICATIONS: No complications occurred during the cath lab visit.  DIAGNOSTIC RECOMMENDATIONS: Patient with abnormal CTA and UA  Cath reveals significant stenosis of the pLAD, mLCx and dRCA.  Plan: CABG eval with Dr Soto  Discussed with Dr Delvin Jacob ([facility])  Prepared and signed by  Patricio Simpson MD  Signed 05/01/2019 18:03:00    < end of copied text >      In summary, TERI DEAL is an 68y Male with past medical history significant for ETOH abuse, found to have 3V CAD pending CABG Tuesday.    1) Cw ASA, Statin  2) cw metoprolol and imdur  3) CIWA protocol  4) CABG Tues  5) Pt urged to ambulate but slowly as to not induce angina

## 2019-05-04 NOTE — PROGRESS NOTE ADULT - SUBJECTIVE AND OBJECTIVE BOX
Subjective:  Pt in bed NAD  No issues overnight     VITAL SIGNS  T(C): 36.9 (19 @ 16:50), Max: 36.9 (19 @ 16:50)  HR: 72 (19 @ 16:50) (56 - 72)  BP: 132/87 (19 @ 16:50) (102/70 - 132/87)  RR: 18 (19 @ 16:50) (16 - 18)  SpO2: 96% (19 @ 16:50) (96% - 100%) RA           Daily     Daily Weight in k.7 (04 May 2019 06:40)  Admit Wt: Drug Dosing Weight  Height (cm): 175.26 (2019 11:19)  Weight (kg): 104.3 (2019 11:19)    Telemetry:   SR   LVEF:  50%    MEDICATIONS  aspirin  chewable 81 milliGRAM(s) Oral daily  atorvastatin 40 milliGRAM(s) Oral at bedtime  chlorhexidine 0.12% Liquid 15 milliLiter(s) Swish and Spit two times a day  enoxaparin Injectable 40 milliGRAM(s) SubCutaneous daily  folic acid 1 milliGRAM(s) Oral daily  gemfibrozil 600 milliGRAM(s) Oral two times a day  isosorbide   mononitrate ER Tablet (IMDUR) 60 milliGRAM(s) Oral daily  LORazepam     Tablet 1 milliGRAM(s) Oral every 2 hours PRN  metoprolol tartrate 25 milliGRAM(s) Oral two times a day  nystatin Powder 1 Application(s) Topical two times a day  pantoprazole    Tablet 40 milliGRAM(s) Oral before breakfast  tamsulosin 0.4 milliGRAM(s) Oral at bedtime    MEDICATIONS  (PRN):  LORazepam     Tablet 1 milliGRAM(s) Oral every 2 hours PRN CIWA-Ar score increase by 2 points and a total score of 7 or less        PHYSICAL EXAM  Neuro: A+O x 3, non-focal, speech clear and intact  Pulm: CTA, good air entry, equal bilaterally, no rales/rhonchi/wheezing, no accessory muscle use noted  CV: RRR, S1, S2. No murmurs, rubs, or gallops noted.  Abd: +BSx4. Soft, nontender, nondistended.  Ext: PARIKH x 4, no edema, no cyanosis or clubbing, distal motor/neuro/circ intact  Skin: warm, dry, no rashes      I&O's Detail    03 May 2019 07:01  -  04 May 2019 07:00  --------------------------------------------------------  IN:  Total IN: 0 mL    OUT:    Voided: 1225 mL  Total OUT: 1225 mL    Total NET: -1225 mL      04 May 2019 07:  -  04 May 2019 17:40  --------------------------------------------------------  IN:    Oral Fluid: 240 mL  Total IN: 240 mL    OUT:  Total OUT: 0 mL    Total NET: 240 mL          LABS      139  |  101  |  20.0  ----------------------------<  104  4.1   |  24.0  |  1.09    Ca    8.9      03 May 2019 06:02  Mg     2.0                                        13.5   5.7   )-----------( 202      ( 03 May 2019 06:02 )             39.9                     CAPILLARY BLOOD GLUCOSE               < from: Xray Chest 2 Views PA/Lat (19 @ 12:47) >  The lungs are of equal and symmetric in aeration. The bronchovascular   markings are unremarkable.  There is no acute parenchymal consolidation.    There is no pleural effusion. The cardiomediastinal silhouette is within   normal limits.  There isno acute osseous finding.    IMPRESSION:  Clear lungs.    < end of copied text >      PAST MEDICAL & SURGICAL HISTORY:  ETOH abuse  Esophageal varices  Hyperlipidemia  Hypertension  No significant past surgical history

## 2019-05-04 NOTE — PROGRESS NOTE ADULT - ASSESSMENT
67 yo male with alcoholism, hyperlipidemia, HTN obese admitted for ACS  CTA of coronaries positive LAD lesion  s/p cardiac cath with 3VD. planned CABG early next week.  episodes of urinary retention, planned for gallego placement by urology intraoperatively, thus far bladder scans negative post void.        1- CAD with unstable angina   s/p C with  3VD   cardiothoracic surgery on board for CABG likely  next week   cont metoprolol and imdur , aspirin   stop amlodipine,  hold acei  restart as needed     2- HTN controlled soft   monitor on current medication     3- Alcoholism   no withdrawal  symptoms on CIWA protocol  cont folic acid thiamine   US of abd no ascities   cont thiamine , folic acid     4- Hyperlipidemia   cont lopid and statin     5- urinary retention  ua negative  urology following, flomax   bladder scan PRN

## 2019-05-04 NOTE — PROGRESS NOTE ADULT - SUBJECTIVE AND OBJECTIVE BOX
INTERVAL HPI/OVERNIGHT EVENTS:  Voiding well.  He reports a hx of a urethral stricture and he catheterizes himself every 15 days with a 16 fr catheter to keep his urethra patent.  MEDICATIONS  (STANDING):  aspirin  chewable 81 milliGRAM(s) Oral daily  atorvastatin 20 milliGRAM(s) Oral at bedtime  chlorhexidine 0.12% Liquid 15 milliLiter(s) Swish and Spit two times a day  enoxaparin Injectable 40 milliGRAM(s) SubCutaneous daily  folic acid 1 milliGRAM(s) Oral daily  gemfibrozil 600 milliGRAM(s) Oral two times a day  isosorbide   mononitrate ER Tablet (IMDUR) 60 milliGRAM(s) Oral daily  metoprolol tartrate 25 milliGRAM(s) Oral two times a day  mupirocin 2% Ointment 1 Application(s) Topical two times a day  nystatin Powder 1 Application(s) Topical two times a day  pantoprazole    Tablet 40 milliGRAM(s) Oral before breakfast  tamsulosin 0.4 milliGRAM(s) Oral at bedtime    MEDICATIONS  (PRN):  LORazepam     Tablet 1 milliGRAM(s) Oral every 2 hours PRN CIWA-Ar score increase by 2 points and a total score of 7 or less      Allergies    No Known Allergies    Intolerances        Vital Signs Last 24 Hrs  T(C): 36.4 (04 May 2019 09:56), Max: 36.6 (03 May 2019 16:40)  T(F): 97.6 (04 May 2019 09:56), Max: 97.9 (03 May 2019 16:40)  HR: 57 (04 May 2019 09:56) (56 - 65)  BP: 105/56 (04 May 2019 09:56) (102/70 - 178/74)  BP(mean): --  RR: 16 (04 May 2019 09:56) (16 - 20)  SpO2: 96% (04 May 2019 09:56) (96% - 100%)     ON PE:  General: alert and awake  Abdomen: soft,. nt. nd, bs+     exam- Patient refused       LABS:                        13.5   5.7   )-----------( 202      ( 03 May 2019 06:02 )             39.9     05-03    139  |  101  |  20.0  ----------------------------<  104  4.1   |  24.0  |  1.09    Ca    8.9      03 May 2019 06:02  Mg     2.0     05-03            RADIOLOGY & ADDITIONAL TESTS:

## 2019-05-04 NOTE — CONSULT NOTE ADULT - PROBLEM SELECTOR RECOMMENDATION 9
continue flomax    serial bladder scans    Place gallego in OR as per protocol for his cardiac surgery    If issues placing the gallego in OR please contact [facility] intraop to assist with the gallego      Start flomax back post operatively    WIll follow

## 2019-05-04 NOTE — PHARMACOTHERAPY INTERVENTION NOTE - COMMENTS
The use of atorvastatin and gemfibrozil is not recommended to the risk of myopathy and myalgia. Given the patient's triglyceride of 380, it was recommended to d/c gemfibrozil. Concomitant use of atorvastatin and gemfibrozil should be avoided. Gemfibrozil may enhance the myopathic (rhabdomyolysis) effect of atorvastatin and may increase the serum concentration of atorvastatin. Given the patient's triglyceride of 340 (5/1/19), it was recommended to d/c gemfibrozil.

## 2019-05-04 NOTE — PROGRESS NOTE ADULT - SUBJECTIVE AND OBJECTIVE BOX
seen for CAD    no acute complaints/events  ros otherwise negative     MEDICATIONS  (STANDING):  aspirin  chewable 81 milliGRAM(s) Oral daily  atorvastatin 20 milliGRAM(s) Oral at bedtime  chlorhexidine 0.12% Liquid 15 milliLiter(s) Swish and Spit two times a day  enoxaparin Injectable 40 milliGRAM(s) SubCutaneous daily  folic acid 1 milliGRAM(s) Oral daily  gemfibrozil 600 milliGRAM(s) Oral two times a day  isosorbide   mononitrate ER Tablet (IMDUR) 60 milliGRAM(s) Oral daily  metoprolol tartrate 25 milliGRAM(s) Oral two times a day  mupirocin 2% Ointment 1 Application(s) Topical two times a day  nystatin Powder 1 Application(s) Topical two times a day  pantoprazole    Tablet 40 milliGRAM(s) Oral before breakfast  tamsulosin 0.4 milliGRAM(s) Oral at bedtime    MEDICATIONS  (PRN):  LORazepam     Tablet 1 milliGRAM(s) Oral every 2 hours PRN CIWA-Ar score increase by 2 points and a total score of 7 or less      Allergies    No Known Allergies      Vital Signs Last 24 Hrs  T(C): 36.4 (04 May 2019 09:56), Max: 36.6 (03 May 2019 16:40)  T(F): 97.6 (04 May 2019 09:56), Max: 97.9 (03 May 2019 16:40)  HR: 57 (04 May 2019 09:56) (56 - 65)  BP: 105/56 (04 May 2019 09:56) (102/70 - 178/74)  BP(mean): --  RR: 16 (04 May 2019 09:56) (16 - 20)  SpO2: 96% (04 May 2019 09:56) (96% - 100%)    PHYSICAL EXAM:    GENERAL: NAD  CHEST/LUNG: Clear to percussion bilaterally  HEART: Regular rate and rhythm; S1 S2  ABDOMEN: Soft  Bowel sounds present  EXTREMITIES:  no edema   NERVOUS SYSTEM:  Alert & Oriented X3, nonfocal    LABS:                        13.5   5.7   )-----------( 202      ( 03 May 2019 06:02 )             39.9     05-03    139  |  101  |  20.0  ----------------------------<  104  4.1   |  24.0  |  1.09    Ca    8.9      03 May 2019 06:02  Mg     2.0     05-03            CAPILLARY BLOOD GLUCOSE            RADIOLOGY & ADDITIONAL TESTS:

## 2019-05-05 DIAGNOSIS — Z29.9 ENCOUNTER FOR PROPHYLACTIC MEASURES, UNSPECIFIED: ICD-10-CM

## 2019-05-05 DIAGNOSIS — I10 ESSENTIAL (PRIMARY) HYPERTENSION: ICD-10-CM

## 2019-05-05 DIAGNOSIS — E78.5 HYPERLIPIDEMIA, UNSPECIFIED: ICD-10-CM

## 2019-05-05 LAB
CULTURE RESULTS: NO GROWTH — SIGNIFICANT CHANGE UP
SPECIMEN SOURCE: SIGNIFICANT CHANGE UP

## 2019-05-05 PROCEDURE — 99232 SBSQ HOSP IP/OBS MODERATE 35: CPT | Mod: 24

## 2019-05-05 RX ADMIN — CHLORHEXIDINE GLUCONATE 1 APPLICATION(S): 213 SOLUTION TOPICAL at 09:24

## 2019-05-05 RX ADMIN — TAMSULOSIN HYDROCHLORIDE 0.4 MILLIGRAM(S): 0.4 CAPSULE ORAL at 22:13

## 2019-05-05 RX ADMIN — ENOXAPARIN SODIUM 40 MILLIGRAM(S): 100 INJECTION SUBCUTANEOUS at 22:13

## 2019-05-05 RX ADMIN — CHLORHEXIDINE GLUCONATE 15 MILLILITER(S): 213 SOLUTION TOPICAL at 05:20

## 2019-05-05 RX ADMIN — ATORVASTATIN CALCIUM 40 MILLIGRAM(S): 80 TABLET, FILM COATED ORAL at 22:13

## 2019-05-05 RX ADMIN — CHLORHEXIDINE GLUCONATE 15 MILLILITER(S): 213 SOLUTION TOPICAL at 17:53

## 2019-05-05 RX ADMIN — Medication 81 MILLIGRAM(S): at 09:37

## 2019-05-05 RX ADMIN — NYSTATIN CREAM 1 APPLICATION(S): 100000 CREAM TOPICAL at 05:20

## 2019-05-05 RX ADMIN — ISOSORBIDE MONONITRATE 60 MILLIGRAM(S): 60 TABLET, EXTENDED RELEASE ORAL at 09:37

## 2019-05-05 RX ADMIN — Medication 25 MILLIGRAM(S): at 17:53

## 2019-05-05 RX ADMIN — NYSTATIN CREAM 1 APPLICATION(S): 100000 CREAM TOPICAL at 17:54

## 2019-05-05 RX ADMIN — PANTOPRAZOLE SODIUM 40 MILLIGRAM(S): 20 TABLET, DELAYED RELEASE ORAL at 05:20

## 2019-05-05 RX ADMIN — Medication 1 MILLIGRAM(S): at 09:37

## 2019-05-05 NOTE — PROGRESS NOTE ADULT - ASSESSMENT
68 year old male with a PMH ETOH, esophageal varacies, HTN, HLD, admitted on 4/30 with CP found to have CAD on CATH which indicated triple vessel disease.

## 2019-05-05 NOTE — PROGRESS NOTE ADULT - SUBJECTIVE AND OBJECTIVE BOX
Subjective: "I feel good, no complaints."  Pt. OOB to chair, denies any SOB or chest pain, NAD noted.    VITAL SIGNS  Vital Signs Last 24 Hrs  T(C): 36.7 (05-05-19 @ 15:55), Max: 36.8 (05-05-19 @ 09:53)  T(F): 98 (05-05-19 @ 15:55), Max: 98.3 (05-05-19 @ 09:53)  HR: 68 (05-05-19 @ 15:55) (51 - 77)  BP: 134/80 (05-05-19 @ 15:55) (118/74 - 180/100)  RR: 18 (05-05-19 @ 15:55) (16 - 18)  SpO2: 92% (05-05-19 @ 15:55) (92% - 98%)  on (O2)              Telemetry: Sinus rhythm   LVEF: 50-55%    MEDICATIONS  aspirin  chewable 81 milliGRAM(s) Oral daily  atorvastatin 40 milliGRAM(s) Oral at bedtime  chlorhexidine 0.12% Liquid 15 milliLiter(s) Swish and Spit two times a day  chlorhexidine 4% Liquid 1 Application(s) Topical two times a day  enoxaparin Injectable 40 milliGRAM(s) SubCutaneous daily  folic acid 1 milliGRAM(s) Oral daily  isosorbide   mononitrate ER Tablet (IMDUR) 60 milliGRAM(s) Oral daily  LORazepam     Tablet 1 milliGRAM(s) Oral every 2 hours PRN  metoprolol tartrate 25 milliGRAM(s) Oral two times a day  nystatin Powder 1 Application(s) Topical two times a day  pantoprazole    Tablet 40 milliGRAM(s) Oral before breakfast  tamsulosin 0.4 milliGRAM(s) Oral at bedtime      PHYSICAL EXAM  General: well nourished, well developed, no acute distress  Neurology: alert and oriented x 3, nonfocal, no gross deficits  Respiratory: clear to auscultation bilaterally  CV: regular rate and rhythm, normal S1, S2  Abdomen: soft, obese, nontender, nondistended, positive bowel sounds, last bowel movement 5/5/19  Extremities: warm, well perfused. Trace edema x2BLE. + DP pulses      I&O's Detail    04 May 2019 07:01  -  05 May 2019 07:00  --------------------------------------------------------  IN:    Oral Fluid: 420 mL  Total IN: 420 mL    OUT:    Voided: 875 mL  Total OUT: 875 mL    Total NET: -455 mL      05 May 2019 07:01  -  05 May 2019 16:51  --------------------------------------------------------  IN:    Oral Fluid: 760 mL  Total IN: 760 mL    OUT:    Voided: 350 mL  Total OUT: 350 mL    Total NET: 410 mL          Weights:  Daily     Daily   Admit Wt: Drug Dosing Weight  Height (cm): 175.26 (30 Apr 2019 11:19)  Weight (kg): 104.3 (30 Apr 2019 11:19)  BMI (kg/m2): 34 (30 Apr 2019 11:19)  BSA (m2): 2.19 (30 Apr 2019 11:19)    LABS                              CAPILLARY BLOOD GLUCOSE  Today's CXR:< from: Xray Chest 2 Views PA/Lat (04.30.19 @ 12:47) >  EXAM:  XR CHEST PA LAT 2V                          PROCEDURE DATE:  04/30/2019          INTERPRETATION:      INDICATION: Chest pain for one week.    PA and lateral views of chest. No previous studies are available for   comparison.    FINDINGS:       The lungs are of equal and symmetric in aeration. The bronchovascular   markings are unremarkable.  There is no acute parenchymal consolidation.    There is no pleural effusion. The cardiomediastinal silhouette is within   normal limits.  There isno acute osseous finding.    IMPRESSION:  Clear lungs.      DIONNE IRIZARRY M.D., ATTENDING RADIOLOGIST  This document has been electronically signed. Apr 30 2019  1:28PM    < end of copied text >      Today's EKG:< from: 12 Lead ECG (05.04.19 @ 07:54) >  Ventricular Rate 53 BPM    Atrial Rate 53 BPM    P-R Interval 172 ms    QRS Duration 82 ms    Q-T Interval 434 ms    QTC Calculation(Bezet) 407 ms    P Axis 22 degrees    R Axis 42 degrees    T Axis 65 degrees    Diagnosis Line Sinus bradycardia  Otherwise normal ECG    Confirmed by HORMOZI, TOR ([id number]) on 5/4/2019 2:49:20 PM    < end of copied text >    < from: US Duplex Carotid Arteries Complete, Bilateral (05.01.19 @ 21:44) >  EXAM:  US DPLX CAROTIDS COMPL BI                          PROCEDURE DATE:  05/01/2019          INTERPRETATION:  US CAROTID DUPLEX COMPLETE BILATERAL    HISTORY:  Coronary artery disease. Chest pain. Preoperative.    COMPARISON: None.    < end of copied text >  < from: US Duplex Carotid Arteries Complete, Bilateral (05.01.19 @ 21:44) >  IMPRESSION:    Mild plaque in the carotid bulbs with less than 50% internal carotid   artery stenosis bilaterally.    < end of copied text >      PAST MEDICAL & SURGICAL HISTORY:  ETOH abuse  Esophageal varices  Hyperlipidemia  Hypertension  No significant past surgical history

## 2019-05-05 NOTE — PROGRESS NOTE ADULT - SUBJECTIVE AND OBJECTIVE BOX
· Subjective and Objective: 	  INTERVAL HPI/OVERNIGHT EVENTS:  Voiding well.  He reports a hx of a urethral stricture and he catheterizes himself every 15 days with a 16 fr catheter to keep his urethra patent.  MEDICATIONS  (STANDING):  aspirin  chewable 81 milliGRAM(s) Oral daily  atorvastatin 20 milliGRAM(s) Oral at bedtime  chlorhexidine 0.12% Liquid 15 milliLiter(s) Swish and Spit two times a day  enoxaparin Injectable 40 milliGRAM(s) SubCutaneous daily  folic acid 1 milliGRAM(s) Oral daily  gemfibrozil 600 milliGRAM(s) Oral two times a day  isosorbide   mononitrate ER Tablet (IMDUR) 60 milliGRAM(s) Oral daily  metoprolol tartrate 25 milliGRAM(s) Oral two times a day  mupirocin 2% Ointment 1 Application(s) Topical two times a day  nystatin Powder 1 Application(s) Topical two times a day  pantoprazole    Tablet 40 milliGRAM(s) Oral before breakfast  tamsulosin 0.4 milliGRAM(s) Oral at bedtime    MEDICATIONS  (PRN):  LORazepam     Tablet 1 milliGRAM(s) Oral every 2 hours PRN CIWA-Ar score increase by 2 points and a total score of 7 or less      Allergies    No Known Allergies    Intolerances        Vital Signs Last 24 Hrs  T(C): 36.4 (04 May 2019 09:56), Max: 36.6 (03 May 2019 16:40)  T(F): 97.6 (04 May 2019 09:56), Max: 97.9 (03 May 2019 16:40)  HR: 57 (04 May 2019 09:56) (56 - 65)  BP: 105/56 (04 May 2019 09:56) (102/70 - 178/74)  BP(mean): --  RR: 16 (04 May 2019 09:56) (16 - 20)  SpO2: 96% (04 May 2019 09:56) (96% - 100%)     ON PE:  General: alert and awake  Abdomen: soft,. nt. nd, bs+     exam- Patient refused        RADIOLOGY & ADDITIONAL TESTS:      Assessment and Plan:   · Assessment		  History of urethral stricture     Problem/Plan - 1:  ·  Problem: Stricture of male urethra, unspecified stricture type.  Plan:  Patient agreed to having a temperature probe 16fr gallego to be placed tomorrow afternoon.  The patient does catheterize his own urethra so the urethra may be patent.  Please attempt a gallego placement tomorrow afternoon if the patient continues to agree     I explained that it better to have an attempt at gallego placement preop to prevent delay of his major cardiac surgery · Subjective and Objective: 	  INTERVAL HPI/OVERNIGHT EVENTS:  Voiding well.  He reports a hx of a urethral stricture and he catheterizes himself every 15 days with a 16 fr catheter to keep his urethra patent.  MEDICATIONS  (STANDING):  aspirin  chewable 81 milliGRAM(s) Oral daily  atorvastatin 20 milliGRAM(s) Oral at bedtime  chlorhexidine 0.12% Liquid 15 milliLiter(s) Swish and Spit two times a day  enoxaparin Injectable 40 milliGRAM(s) SubCutaneous daily  folic acid 1 milliGRAM(s) Oral daily  gemfibrozil 600 milliGRAM(s) Oral two times a day  isosorbide   mononitrate ER Tablet (IMDUR) 60 milliGRAM(s) Oral daily  metoprolol tartrate 25 milliGRAM(s) Oral two times a day  mupirocin 2% Ointment 1 Application(s) Topical two times a day  nystatin Powder 1 Application(s) Topical two times a day  pantoprazole    Tablet 40 milliGRAM(s) Oral before breakfast  tamsulosin 0.4 milliGRAM(s) Oral at bedtime    MEDICATIONS  (PRN):  LORazepam     Tablet 1 milliGRAM(s) Oral every 2 hours PRN CIWA-Ar score increase by 2 points and a total score of 7 or less      Allergies    No Known Allergies    Intolerances        Vital Signs Last 24 Hrs  Afebrile, avss     ON PE:  General: alert and awake  Abdomen: soft,. nt. nd, bs+     exam- Patient refused        RADIOLOGY & ADDITIONAL TESTS:      Assessment and Plan:   · Assessment		  History of urethral stricture     Problem/Plan - 1:  ·  Problem: Stricture of male urethra, unspecified stricture type.  Plan:  Patient agreed to having a temperature probe 16fr gallego to be placed tomorrow afternoon.  The patient does catheterize his own urethra so the urethra may be patent.  Please attempt a gallego placement tomorrow afternoon if the patient continues to agree     I explained that it better to have an attempt at gallego placement preop to prevent delay of his major cardiac surgery

## 2019-05-06 PROBLEM — I85.00 ESOPHAGEAL VARICES WITHOUT BLEEDING: Chronic | Status: ACTIVE | Noted: 2019-04-30

## 2019-05-06 PROBLEM — I10 ESSENTIAL (PRIMARY) HYPERTENSION: Chronic | Status: ACTIVE | Noted: 2019-04-30

## 2019-05-06 PROBLEM — F10.10 ALCOHOL ABUSE, UNCOMPLICATED: Chronic | Status: ACTIVE | Noted: 2019-04-30

## 2019-05-06 PROBLEM — E78.5 HYPERLIPIDEMIA, UNSPECIFIED: Chronic | Status: ACTIVE | Noted: 2019-04-30

## 2019-05-06 LAB
ALBUMIN SERPL ELPH-MCNC: 3.9 G/DL — SIGNIFICANT CHANGE UP (ref 3.3–5.2)
ALP SERPL-CCNC: 69 U/L — SIGNIFICANT CHANGE UP (ref 40–120)
ALT FLD-CCNC: 15 U/L — SIGNIFICANT CHANGE UP
ANION GAP SERPL CALC-SCNC: 13 MMOL/L — SIGNIFICANT CHANGE UP (ref 5–17)
AST SERPL-CCNC: 17 U/L — SIGNIFICANT CHANGE UP
BILIRUB SERPL-MCNC: 0.8 MG/DL — SIGNIFICANT CHANGE UP (ref 0.4–2)
BLD GP AB SCN SERPL QL: SIGNIFICANT CHANGE UP
BUN SERPL-MCNC: 19 MG/DL — SIGNIFICANT CHANGE UP (ref 8–20)
CALCIUM SERPL-MCNC: 9 MG/DL — SIGNIFICANT CHANGE UP (ref 8.6–10.2)
CHLORIDE SERPL-SCNC: 104 MMOL/L — SIGNIFICANT CHANGE UP (ref 98–107)
CO2 SERPL-SCNC: 25 MMOL/L — SIGNIFICANT CHANGE UP (ref 22–29)
CREAT SERPL-MCNC: 1.01 MG/DL — SIGNIFICANT CHANGE UP (ref 0.5–1.3)
GLUCOSE SERPL-MCNC: 91 MG/DL — SIGNIFICANT CHANGE UP (ref 70–115)
HCT VFR BLD CALC: 37.2 % — LOW (ref 42–52)
HGB BLD-MCNC: 12.2 G/DL — LOW (ref 14–18)
MAGNESIUM SERPL-MCNC: 1.9 MG/DL — SIGNIFICANT CHANGE UP (ref 1.6–2.6)
MCHC RBC-ENTMCNC: 22.9 PG — LOW (ref 27–31)
MCHC RBC-ENTMCNC: 32.8 G/DL — SIGNIFICANT CHANGE UP (ref 32–36)
MCV RBC AUTO: 69.8 FL — LOW (ref 80–94)
PHOSPHATE SERPL-MCNC: 3.2 MG/DL — SIGNIFICANT CHANGE UP (ref 2.4–4.7)
PLATELET # BLD AUTO: 190 K/UL — SIGNIFICANT CHANGE UP (ref 150–400)
POTASSIUM SERPL-MCNC: 3.7 MMOL/L — SIGNIFICANT CHANGE UP (ref 3.5–5.3)
POTASSIUM SERPL-SCNC: 3.7 MMOL/L — SIGNIFICANT CHANGE UP (ref 3.5–5.3)
PROT SERPL-MCNC: 7.6 G/DL — SIGNIFICANT CHANGE UP (ref 6.6–8.7)
RBC # BLD: 5.33 M/UL — SIGNIFICANT CHANGE UP (ref 4.6–6.2)
RBC # FLD: 15.9 % — HIGH (ref 11–15.6)
SODIUM SERPL-SCNC: 142 MMOL/L — SIGNIFICANT CHANGE UP (ref 135–145)
TYPE + AB SCN PNL BLD: SIGNIFICANT CHANGE UP
WBC # BLD: 5.6 K/UL — SIGNIFICANT CHANGE UP (ref 4.8–10.8)
WBC # FLD AUTO: 5.6 K/UL — SIGNIFICANT CHANGE UP (ref 4.8–10.8)

## 2019-05-06 PROCEDURE — 99232 SBSQ HOSP IP/OBS MODERATE 35: CPT | Mod: 24

## 2019-05-06 PROCEDURE — 99222 1ST HOSP IP/OBS MODERATE 55: CPT

## 2019-05-06 RX ORDER — CEFUROXIME AXETIL 250 MG
1500 TABLET ORAL ONCE
Qty: 0 | Refills: 0 | Status: DISCONTINUED | OUTPATIENT
Start: 2019-05-07 | End: 2019-05-07

## 2019-05-06 RX ORDER — POTASSIUM CHLORIDE 20 MEQ
40 PACKET (EA) ORAL ONCE
Qty: 0 | Refills: 0 | Status: COMPLETED | OUTPATIENT
Start: 2019-05-06 | End: 2019-05-06

## 2019-05-06 RX ORDER — VANCOMYCIN HCL 1 G
1000 VIAL (EA) INTRAVENOUS ONCE
Qty: 0 | Refills: 0 | Status: COMPLETED | OUTPATIENT
Start: 2019-05-07 | End: 2019-05-07

## 2019-05-06 RX ORDER — MAGNESIUM SULFATE 500 MG/ML
2 VIAL (ML) INJECTION ONCE
Qty: 0 | Refills: 0 | Status: DISCONTINUED | OUTPATIENT
Start: 2019-05-06 | End: 2019-05-06

## 2019-05-06 RX ORDER — MAGNESIUM SULFATE 500 MG/ML
2 VIAL (ML) INJECTION ONCE
Qty: 0 | Refills: 0 | Status: COMPLETED | OUTPATIENT
Start: 2019-05-06 | End: 2019-05-06

## 2019-05-06 RX ORDER — POTASSIUM CHLORIDE 20 MEQ
40 PACKET (EA) ORAL EVERY 4 HOURS
Qty: 0 | Refills: 0 | Status: COMPLETED | OUTPATIENT
Start: 2019-05-06 | End: 2019-05-06

## 2019-05-06 RX ADMIN — Medication 50 GRAM(S): at 10:56

## 2019-05-06 RX ADMIN — CHLORHEXIDINE GLUCONATE 1 APPLICATION(S): 213 SOLUTION TOPICAL at 07:51

## 2019-05-06 RX ADMIN — ENOXAPARIN SODIUM 40 MILLIGRAM(S): 100 INJECTION SUBCUTANEOUS at 11:57

## 2019-05-06 RX ADMIN — CHLORHEXIDINE GLUCONATE 1 APPLICATION(S): 213 SOLUTION TOPICAL at 22:57

## 2019-05-06 RX ADMIN — NYSTATIN CREAM 1 APPLICATION(S): 100000 CREAM TOPICAL at 06:09

## 2019-05-06 RX ADMIN — PANTOPRAZOLE SODIUM 40 MILLIGRAM(S): 20 TABLET, DELAYED RELEASE ORAL at 06:09

## 2019-05-06 RX ADMIN — ATORVASTATIN CALCIUM 40 MILLIGRAM(S): 80 TABLET, FILM COATED ORAL at 23:00

## 2019-05-06 RX ADMIN — Medication 1 MILLIGRAM(S): at 11:56

## 2019-05-06 RX ADMIN — Medication 81 MILLIGRAM(S): at 11:56

## 2019-05-06 RX ADMIN — CHLORHEXIDINE GLUCONATE 15 MILLILITER(S): 213 SOLUTION TOPICAL at 17:08

## 2019-05-06 RX ADMIN — Medication 40 MILLIEQUIVALENT(S): at 17:08

## 2019-05-06 RX ADMIN — CHLORHEXIDINE GLUCONATE 15 MILLILITER(S): 213 SOLUTION TOPICAL at 06:09

## 2019-05-06 RX ADMIN — Medication 40 MILLIEQUIVALENT(S): at 11:57

## 2019-05-06 RX ADMIN — NYSTATIN CREAM 1 APPLICATION(S): 100000 CREAM TOPICAL at 17:08

## 2019-05-06 RX ADMIN — TAMSULOSIN HYDROCHLORIDE 0.4 MILLIGRAM(S): 0.4 CAPSULE ORAL at 23:00

## 2019-05-06 RX ADMIN — Medication 40 MILLIEQUIVALENT(S): at 11:56

## 2019-05-06 RX ADMIN — ISOSORBIDE MONONITRATE 60 MILLIGRAM(S): 60 TABLET, EXTENDED RELEASE ORAL at 11:56

## 2019-05-06 NOTE — CONSULT NOTE ADULT - SUBJECTIVE AND OBJECTIVE BOX
Formerly [facility], [facility]                                   [address] [place] [address]                                                      PHONE: [phone]                                                         FAX: [phone]  [email or web address]  ---------------------------------------------------------------------------------------------------------------------------------    Reason for Consult: chest pain    HPI:  TERI DEAL is an 68y Male HTN, HLD, daily EtOH use presented this morning with left chest and shoulder pain for the past five days. 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 He denies any associated cardiovascular symptoms. He says his brother had recent PCI after having similar symptoms. The patient had negative/normal echo and PET myocardial perfusion scan in Oct 2017.    PAST MEDICAL & SURGICAL HISTORY:  ETOH abuse  Esophageal varices  Hyperlipidemia  Hypertension  No significant past surgical history      No Known Allergies      MEDICATIONS  (STANDING):    MEDICATIONS  (PRN):      Social History:  Cigarettes:                    Alchohol:                 Illicit Drug Abuse:      ROS: Negative other than as mentioned in HPI.    Vital Signs Last 24 Hrs  T(C): 37 (30 Apr 2019 11:19), Max: 37 (30 Apr 2019 11:19)  T(F): 98.6 (30 Apr 2019 11:19), Max: 98.6 (30 Apr 2019 11:19)  HR: 55 (30 Apr 2019 13:37) (55 - 73)  BP: 152/94 (30 Apr 2019 13:37) (152/94 - 174/105)  BP(mean): --  RR: 16 (30 Apr 2019 13:37) (16 - 18)  SpO2: 97% (30 Apr 2019 13:37) (97% - 99%)  ICU Vital Signs Last 24 Hrs  TERI DEAL  I&O's Detail    I&O's Summary    Drug Dosing Weight  TERI DEAL      PHYSICAL EXAM:  General: Appears well developed, well nourished alert and cooperative.  HEENT: Head; normocephalic, atraumatic.  Eyes: Pupils reactive, cornea wnl.  Neck: Supple, no nodes adenopathy, no NVD or carotid bruit or thyromegaly.  CARDIOVASCULAR: Normal S1 and S2, No murmur, rub, gallop or lift.   LUNGS: No rales, rhonchi or wheeze. Normal breath sounds bilaterally.  ABDOMEN: Soft, nontender without mass or organomegaly. bowel sounds normoactive.  EXTREMITIES: No clubbing, cyanosis or edema. Distal pulses wnl.   SKIN: warm and dry with normal turgor.  NEURO: Alert/oriented x 3/normal motor exam. No pathologic reflexes.    PSYCH: normal affect.        LABS:                        13.2   4.5   )-----------( 176      ( 30 Apr 2019 12:08 )             40.3     04-30    137  |  100  |  14.0  ----------------------------<  101  4.2   |  24.0  |  0.95    Ca    9.1      30 Apr 2019 12:08  Mg     1.9     04-30    TPro  8.0  /  Alb  4.2  /  TBili  0.5  /  DBili  x   /  AST  19  /  ALT  14  /  AlkPhos  76  04-30    TERI DEAL  CARDIAC MARKERS ( 30 Apr 2019 12:08 )  x     / <0.01 ng/mL / 53 U/L / x     / x          PT/INR - ( 30 Apr 2019 12:09 )   PT: 11.9 sec;   INR: 1.03 ratio         PTT - ( 30 Apr 2019 12:09 )  PTT:36.7 sec      RADIOLOGY & ADDITIONAL STUDIES:    INTERPRETATION OF TELEMETRY (personally reviewed): sinus rhythm without arrhythmia     ECG: sinus rhythm 69 bpm, normal axis, normal intervals, no ischemic ST abnormality    ECHO Oct 2017: EF 55-60^, trivial MR and TR    PET myocardial perfusion scan Oct 2017: normal rest-stress perfusion    Assessment and Plan:  In summary, TERI DEAL is an 68y Male with past medical history significant for HTN, HLD, daily EtOH use presents with atypical chest pain for the past five days.    Atypical Chest Pain -- EKG not ischemic and first troponin is negative. He had normal/negative echo and PET myocardial perfusion scan in Oct 2017. My suspicion for ACS at this time is low, but I cannot rule out underlying CAD.  - agree with ASA/statin  - check second troponin and repeat EKG  - f/u coronary CTA  - remaining workup will depend on testing results -- if second troponin is negative and CTA shows low Ca score and no obstructive coronary lesions, the patient can be discharged home this evening with close follow up in our office with Dr. Duran. If high Ca score and/or obstructive coronary lesions are seen, will plan for cardiac catheterization.     Will follow with you.     Thank you for allowing [facility] to participate in the care of this patient. Please do not hesitate to call with any questions or concerns.
HPI:  67 yo male obese with HTN , alcoholism presented to the ED with complaints of left sided chest pain and shortness of breath . He states that has been feeling left chest discomfort since Thursday on off worsening sharp pain with no relieve completly , radiates to his back and left shoulder . associated with dyspnea on exertion heavy work , at present has pain 4/10 , he admits drinking 4-6 beers almost daily in the evening , last drink was last night . Evaluated in the Ed by cardiology m CTA of coronaries done showed CAD plaque , now being admitted for cardiac cath unstable angina . (2019 17:05)    Urology was called preemptively as he had a hx of urinary retention.  HE denies significant voiding complaints currently.  No hematuria, dysuria, frequency, hesitency, need to push to void.        PAST MEDICAL & SURGICAL HISTORY:  ETOH abuse  Esophageal varices  Hyperlipidemia  Hypertension  No significant past surgical history      REVIEW OF SYSTEMS:    Reviewed h and p ros and as above    MEDICATIONS  (STANDING):  aspirin  chewable 81 milliGRAM(s) Oral daily  atorvastatin 20 milliGRAM(s) Oral at bedtime  chlorhexidine 0.12% Liquid 15 milliLiter(s) Swish and Spit two times a day  enoxaparin Injectable 40 milliGRAM(s) SubCutaneous daily  folic acid 1 milliGRAM(s) Oral daily  gemfibrozil 600 milliGRAM(s) Oral two times a day  isosorbide   mononitrate ER Tablet (IMDUR) 60 milliGRAM(s) Oral daily  metoprolol tartrate 25 milliGRAM(s) Oral two times a day  mupirocin 2% Ointment 1 Application(s) Topical two times a day  pantoprazole    Tablet 40 milliGRAM(s) Oral before breakfast  tamsulosin 0.4 milliGRAM(s) Oral at bedtime    MEDICATIONS  (PRN):  LORazepam     Tablet 1 milliGRAM(s) Oral every 2 hours PRN CIWA-Ar score increase by 2 points and a total score of 7 or less      Allergies    No Known Allergies    Intolerances        SOCIAL HISTORY:    FAMILY HISTORY:  FH: CAD (coronary artery disease): brother dx ed at age 62  FH: Alzheimers disease: father  at age 74         Vital Signs Last 24 Hrs  T(C): 36.5 (03 May 2019 22:51), Max: 37.4 (03 May 2019 10:05)  T(F): 97.7 (03 May 2019 22:51), Max: 99.3 (03 May 2019 10:05)  HR: 65 (03 May 2019 22:51) (58 - 65)  BP: 125/80 (03 May 2019 22:51) (125/80 - 178/74)  BP(mean): --  RR: 18 (03 May 2019 22:51) (18 - 20)  SpO2: 100% (03 May 2019 22:51) (93% - 100%)    PHYSICAL EXAM:    General: Well developed; well nourished; in no acute distress  Head: Normocephalic; atraumatic  Respiratory: No wheezes, rales or rhonchi  Cardiovascular: abd- soft, nt,. nd, bs+, no masses     deferred until tomorrow    LABS:                        13.5   5.7   )-----------( 202      ( 03 May 2019 06:02 )             39.9     05-03    139  |  101  |  20.0  ----------------------------<  104  4.1   |  24.0  |  1.09    Ca    8.9      03 May 2019 06:02  Phos  4.1     05-02  Mg     2.0     05-03            RADIOLOGY & ADDITIONAL STUDIES:
HPI:  69 yo male obese with HTN , moderate alcohol consumption, less than 1 case of beer/ week; presented to the ED with complaints of left sided chest pain and shortness of breath   Dx angina pectoris. Currently evaluated with CTA of chest and Cardiac cath showing 3 vessel disease. Awaiting Cardiac surgery tomorrow.  Pt admits to consumption of 4 beers 3 x / week in the evening.  No hx of alcohol dependence.  No DWI.  No withdrawal symptoms.  No seizures or known liver disease.   Pt had episode of UGI bleeding 10 yrs ago when enroute from [place] to [facility].  Subsequent EGD was done.  No varices.  Unknown source of bleeding.  Records NA.  No intervention or surgery required.  No hx of hepatitis or IVDU.  May have been distantly transfused, unsure.  Pt had screening colonoscopy and EGD 3 yrs ago at [facility] in [place] and was told that both studies were normal.  Pt intermittently takes omeprazole for heartburn.  No UBI or lower GI alarm symptoms.  No current complaints.          PAST MEDICAL & SURGICAL HISTORY:  ETOH abuse  Hyperlipidemia  Hypertension  Obesity  No significant past surgical history      ROS:  No Heartburn, regurgitation, dysphagia, odynophagia.  No dyspepsia  No abdominal pain.    No Nausea, vomiting.  No Bleeding.  No hematemesis.   No diarrhea.    No hematochezia.  No weight loss, anorexia.  No edema.      MEDICATIONS  (STANDING):  aspirin  chewable 81 milliGRAM(s) Oral daily  atorvastatin 40 milliGRAM(s) Oral at bedtime  chlorhexidine 0.12% Liquid 15 milliLiter(s) Swish and Spit two times a day  chlorhexidine 4% Liquid 1 Application(s) Topical two times a day  enoxaparin Injectable 40 milliGRAM(s) SubCutaneous daily  folic acid 1 milliGRAM(s) Oral daily  isosorbide   mononitrate ER Tablet (IMDUR) 60 milliGRAM(s) Oral daily  magnesium sulfate  IVPB 2 Gram(s) IV Intermittent once  nystatin Powder 1 Application(s) Topical two times a day  pantoprazole    Tablet 40 milliGRAM(s) Oral before breakfast  potassium chloride    Tablet ER 40 milliEquivalent(s) Oral every 4 hours  tamsulosin 0.4 milliGRAM(s) Oral at bedtime    MEDICATIONS  (PRN):  LORazepam     Tablet 1 milliGRAM(s) Oral every 2 hours PRN CIWA-Ar score increase by 2 points and a total score of 7 or less      Allergies    No Known Allergies    Intolerances    OHS diet (Unknown)      SOCIAL HISTORY:  Non smoker;  No IVDU;  Moderate ETOH    FAMILY HISTORY:  FH: CAD (coronary artery disease): brother dx ed at age 62  FH: Alzheimers disease: father  at age 74         Vital Signs Last 24 Hrs  T(C): 36.4 (06 May 2019 06:06), Max: 36.8 (05 May 2019 09:53)  T(F): 97.6 (06 May 2019 06:06), Max: 98.3 (05 May 2019 09:53)  HR: 83 (06 May 2019 07:56) (52 - 83)  BP: 157/88 (06 May 2019 06:06) (130/90 - 160/98)  BP(mean): --  RR: 20 (06 May 2019 06:06) (18 - 20)  SpO2: 98% (06 May 2019 07:56) (92% - 98%)    PHYSICAL EXAM:    GENERAL: NAD, well-groomed, well-developed  HEAD:  Atraumatic, Normocephalic  EYES: EOMI, PERRLA, conjunctiva and sclera clear  ENMT: No tonsillar erythema, exudates, or enlargement; Moist mucous membranes, Good dentition, No lesions  NECK: Supple, No JVD, Normal thyroid  CHEST/LUNG: Clear to percussion bilaterally; No rales, rhonchi, wheezing, or rubs  HEART: Regular rate and rhythm; No murmurs, rubs, or gallops  ABDOMEN: Soft, Nontender, Nondistended; Bowel sounds present; No HSM.  No MTR.  No Scars.  No distention.  No hernias.   EXTREMITIES:  2+ Peripheral Pulses, No clubbing, cyanosis, or edema  LYMPH: No lymphadenopathy noted  SKIN: No rashes or lesions      LABS:                        12.2   5.6   )-----------( 190      ( 06 May 2019 06:04 )             37.2     05-    142  |  104  |  19.0  ----------------------------<  91  3.7   |  25.0  |  1.01    Ca    9.0      06 May 2019 06:04  Phos  3.2     05-06  Mg     1.9     05-06    TPro  7.6  /  Alb  3.9  /  TBili  0.8  /  DBili  x   /  AST  17  /  ALT  15  /  AlkPhos  69  05-06    HCV AB is negative.        LIVER FUNCTIONS - ( 06 May 2019 06:04 )  Alb: 3.9 g/dL / Pro: 7.6 g/dL / ALK PHOS: 69 U/L / ALT: 15 U/L / AST: 17 U/L / GGT: x           EKG:  NSR. SB.      RADIOLOGY & ADDITIONAL STUDIES:  Sono:  Fatty liver.  No nodularity.  No cirrhosis.  Sludge in GB.  Normal Ducts and spleen.  16.6 cm liver.
Surgeon: Brittany    Consult requesting by: Tatiana    HISTORY OF PRESENT ILLNESS:  69 yo male obese with HTN , alcoholism presented to the ED with complaints of left sided chest pain and shortness of breath . He states that has been feeling left chest discomfort since Thursday on off worsening sharp pain with no relieve completly , radiates to his back and left shoulder . associated with dyspnea on exertion heavy work , at present has pain 4/10 , he admits drinking 4-6 beers almost daily in the evening , last drink was last night . Evaluated in the Ed by cardiology m CTA of coronaries done showed CAD plaque , now being admitted for cardiac cath unstable angina .     Pt Had a CATH today revealing TVD- Results below.. Pt is currently on a CIWA protocol     PAST MEDICAL & SURGICAL HISTORY:  ETOH abuse  Esophageal varices  Hyperlipidemia  Hypertension  No significant past surgical history      MEDICATIONS  (STANDING):  amLODIPine   Tablet 5 milliGRAM(s) Oral daily  aspirin  chewable 81 milliGRAM(s) Oral daily  atorvastatin 20 milliGRAM(s) Oral at bedtime  chlorhexidine 0.12% Liquid 15 milliLiter(s) Swish and Spit two times a day  chlorhexidine 4% Liquid 1 Application(s) Topical two times a day  folic acid 1 milliGRAM(s) Oral daily  gemfibrozil 600 milliGRAM(s) Oral two times a day  metoprolol tartrate 12.5 milliGRAM(s) Oral every 12 hours  mupirocin 2% Ointment 1 Application(s) Topical two times a day  nitroglycerin    2% Ointment 1 Inch(s) Transdermal every 8 hours  pantoprazole    Tablet 40 milliGRAM(s) Oral before breakfast  thiamine 100 milliGRAM(s) Oral daily    MEDICATIONS  (PRN):  LORazepam     Tablet 1 milliGRAM(s) Oral every 2 hours PRN CIWA-Ar score increase by 2 points and a total score of 7 or less    Antiplatelet therapy:              ASA 81               Last dose/amt: current      Allergies    No Known Allergies    Intolerances        SOCIAL HISTORY:  Smoker: [ ] Yes  [ x] No        PACK YEARS:                         WHEN QUIT?  ETOH use: [x ] Yes  [ ] No              FREQUENCY / QUANTITY: 6 beers a day   Ilicit Drug use:  [ ] Yes  [x ] No  Occupation: makes tortillas   Live with: wife   Assisted device use: no     FAMILY HISTORY:  FH: CAD (coronary artery disease): brother dx ed at age 62  FH: Alzheimers disease: father  at age 74         Review of Systems  CONSTITUTIONAL:  Fevers[ ] chills[ ] sweats[ ] fatigue[ ] weight loss[ ] weight gain [ ]                                     NEGATIVE [ x]   NEURO:  parathesias[ ] seizures [ ]  syncope [ ]  confusion [ ]                                                                                NEGATIVE[x ]   EYES: glasses[x ]  blurry vision[ ]  discharge[ ] pain[ ] glaucoma [ ]                                                                          NEGATIVE[ ]   ENMT:  difficulty hearing [ ]  vertigo[ ]  dysphagia[ ] epistaxis[ ] recent dental work [x ]                                    NEGATIVE[ ]   CV:  chest pain[ x] palpitations[ ] ESCOBAR [ ] diaphoresis [ ]                                                                                           NEGATIVE[ ]   RESPIRATORY:  wheezing[ ] SOB[ ] cough [ ] sputum[ ] hemoptysis[ ]                                                                  NEGATIVE[x ]   GI:  nausea[ ]  vommiting [ ]  diarrhea[ ] constipation [ ] melena [ ]                                                                      NEGATIVE[x ]   : hematuria[ ]  dysuria[ ] urgency[ ] incontinence[ ]                                                                                            NEGATIVE[ x]   MUSKULOSKELETAL:  arthritis[ ]  joint swelling [ ] muscle weakness [ ]                                                                NEGATIVE[x ]   SKIN/BREAST:  rash[ ] itching [ ]  hair loss[ ] masses[ ]                                                                                              NEGATIVE[x ]   PSYCH:  dementia [ ] depresion [ ] anxiety[ ]                                                                                                               NEGATIVE[x ]   HEME/LYMPH:  bruises easily[ ] enlarged lymph nodes[ ] tender lymph nodes[ ]                                               NEGATIVE[ x]   ENDOCRINE:  cold intolerance[ ] heat intolerance[ ] polydipsia[ ]                                                                          NEGATIVE[x ]     PHYSICAL EXAM  Vital Signs Last 24 Hrs  T(C): 36.3 (01 May 2019 19:27), Max: 36.7 (01 May 2019 08:07)  T(F): 97.4 (01 May 2019 19:27), Max: 98 (01 May 2019 08:07)  HR: 52 (01 May 2019 19:27) (51 - 60)  BP: 136/70 (01 May 2019 19:27) (115/65 - 167/99)  BP(mean): --  RR: 18 (01 May 2019 19:27) (18 - 20)  SpO2: 98% (01 May 2019 18:45) (92% - 99%)    CONSTITUTIONAL:                                                                          WNL[ x]   Neuro: WNL[x ] Normal exam oriented to person/place & time with no focal motor or sensory  deficits. Other                     Eyes: WNL[x ]   Normal exam of conjunctiva & lids, pupils equally reactive. Other     ENT: WNL[ ]    Normal exam of nasal/oral mucosa with absence of cyanosis. Other poor dental hygiene may broken teeth   Neck: WNL[x ]  Normal exam of jugular veins, trachea & thyroid. Other  Chest: WNL[x ] Normal lung exam with good air movement absence of wheezes, rales, or rhonchi: Other                                                                                CV:  Auscultation: normal x[ ] S3[ ] S4[ ] Irregular [ ] Rub[ ] Clicks[ ]    Murmurs none:[x ]systolic [ ]  diastolic [ ] holosystolic [ ]  Carotids: No Bruits[ x] Other                 Abdominal Aorta: normal [ ] nonpalpable[ x]Other                                                                                      GI:           WNL[x ] Normal exam of abdomen, liver & spleen with no noted masses or tenderness. Other                                                                                                        Extremities: WNL[ x] Normal no evidence of cyanosis or deformity Edema: none[ ]trace[ x]1+[ ]2+[ ]3+[ ]4+[ ]  Lower Extremity Pulses: Right[2+ ] Left[2+ ]Varicosities[ ]  SKIN :WNL[x ] Normal exam to inspection & palation. Other:                                                          LABS:                        13.2   4.5   )-----------( 176      ( 2019 12:08 )             40.3     05-01    136  |  100  |  16.0  ----------------------------<  101  4.0   |  22.0  |  0.99    Ca    8.7      01 May 2019 07:54  Phos  3.2     05-  Mg     2.0     05-01    TPro  7.3  /  Alb  3.7  /  TBili  0.8  /  DBili  0.1  /  AST  18  /  ALT  13  /  AlkPhos  67  05-01    PT/INR - ( 2019 12:09 )   PT: 11.9 sec;   INR: 1.03 ratio         PTT - ( 2019 12:09 )  PTT:36.7 sec    CARDIAC MARKERS ( 01 May 2019 07:54 )  x     / <0.01 ng/mL / x     / x     / x      CARDIAC MARKERS ( 2019 20:06 )  x     / <0.01 ng/mL / x     / x     / x      CARDIAC MARKERS ( 2019 12:08 )  x     / <0.01 ng/mL / 53 U/L / x     / x              Cardiac Cath:  < from: Cardiac Cath Lab - Adult (19 @ 17:22) >  CORONARY VESSELS: The coronary circulation is right dominant.  LAD:   --  Proximal LAD: There was a tubular 90 % stenosis. The lesion was  irregularly contoured and calcified.  CX:   --  Mid circumflex: There was a [id number] % stenosis. The lesion was  irregularly contoured and ulcerated.  RCA:   --  Distal RCA: There was a 70 % stenosis.  COMPLICATIONS: No complications occurred during the cath lab visit.  DIAGNOSTIC RECOMMENDATIONS: Patient with abnormal CTA and UA  Cath reveals significant stenosis of the pLAD, mLCx and dRCA.  Plan: CABG eval with Dr Soto    < end of copied text >    TTE / ANDREW: Pending

## 2019-05-06 NOTE — PROGRESS NOTE ADULT - ASSESSMENT
1. 69 yo male with symptomatic TVCAD and preserved systolic LV function. For CABG tomorrwo. Discussed with pt.  2. hepatic steatosis and varices. Hx of etoh XS.

## 2019-05-06 NOTE — PROGRESS NOTE ADULT - SUBJECTIVE AND OBJECTIVE BOX
Chief Complaint: recent course and chart reviewed    HPI: 67 yo male admitted with angina. Found to have TVCAD on cath with preserved EF. Scheduled for CABG tomorrow.    PAST MEDICAL & SURGICAL HISTORY:  ETOH abuse  Esophageal varices  Hyperlipidemia  Hypertension  No significant past surgical history      PREVIOUS DIAGNOSTIC TESTING:      ECHO  FINDINGS: EF 55%    STRESS  FINDINGS:    CATHETERIZATION  FINDINGS: see above    MEDICATIONS  (STANDING):  aspirin  chewable 81 milliGRAM(s) Oral daily  atorvastatin 40 milliGRAM(s) Oral at bedtime  chlorhexidine 0.12% Liquid 15 milliLiter(s) Swish and Spit two times a day  chlorhexidine 4% Liquid 1 Application(s) Topical two times a day  enoxaparin Injectable 40 milliGRAM(s) SubCutaneous daily  folic acid 1 milliGRAM(s) Oral daily  isosorbide   mononitrate ER Tablet (IMDUR) 60 milliGRAM(s) Oral daily  magnesium sulfate  IVPB 2 Gram(s) IV Intermittent once  nystatin Powder 1 Application(s) Topical two times a day  pantoprazole    Tablet 40 milliGRAM(s) Oral before breakfast  potassium chloride    Tablet ER 40 milliEquivalent(s) Oral every 4 hours  tamsulosin 0.4 milliGRAM(s) Oral at bedtime    MEDICATIONS  (PRN):  LORazepam     Tablet 1 milliGRAM(s) Oral every 2 hours PRN CIWA-Ar score increase by 2 points and a total score of 7 or less      FAMILY HISTORY:  FH: CAD (coronary artery disease): brother dx ed at age 62  FH: Alzheimers disease: father  at age 74         ROS: Negative other than as mentioned in HPI.    Vital Signs Last 24 Hrs  T(C): 36.4 (06 May 2019 06:06), Max: 36.8 (05 May 2019 09:53)  T(F): 97.6 (06 May 2019 06:06), Max: 98.3 (05 May 2019 09:53)  HR: 70 reg (06 May 2019 07:56) (52 - 83)  BP: 120/80 la manually (06 May 2019 06:06) (130/90 - 180/100)  BP(mean): --  RR: 14 flat ora (06 May 2019 06:06) (18 - 20)  SpO2: 98% (06 May 2019 07:56) (92% - 98%)    PHYSICAL EXAM:  General: Appears well developed, well nourished alert and cooperative. Obese ma m nad.  HEENT: Head; normocephalic, atraumatic.  Eyes;   Pupils reactive, cornea wnl.  Neck; Supple, no nodes adenopathy, no NVD or carotid bruit or thyromegaly.  CARDIOVASCULAR; No murmur, rub, gallop or lift. Normal S1 and S2.  LUNGS; No rales, rhonchi or wheeze. Normal breath sounds bilaterally.  ABDOMEN ; Soft, nontender without mass or organomegaly. bowel sounds normoactive.  EXTREMITIES; No clubbing, cyanosis or edema. Distal pulses wnl. ROM normal.  SKIN; warm and dry with normal turgor.  NEURO; Alert/oriented x 3/normal motor exam.     PSYCH; normal affect.            INTERPRETATION OF TELEMETRY:    ECG: SR alana carmen T in L.    I&O's Detail    05 May 2019 07:01  -  06 May 2019 07:00  --------------------------------------------------------  IN:    Oral Fluid: 850 mL  Total IN: 850 mL    OUT:    Voided: 750 mL  Total OUT: 750 mL    Total NET: 100 mL          LABS:                        12.2   5.6   )-----------( 190      ( 06 May 2019 06:04 )             37.2         142  |  104  |  19.0  ----------------------------<  91  3.7   |  25.0  |  1.01    Ca    9.0      06 May 2019 06:04  Phos  3.2       Mg     1.9         TPro  7.6  /  Alb  3.9  /  TBili  0.8  /  DBili  x   /  AST  17  /  ALT  15  /  AlkPhos  69              I&O's Summary    05 May 2019 07:01  -  06 May 2019 07:00  --------------------------------------------------------  IN: 850 mL / OUT: 750 mL / NET: 100 mL        RADIOLOGY & ADDITIONAL STUDIES:

## 2019-05-06 NOTE — PROGRESS NOTE ADULT - SUBJECTIVE AND OBJECTIVE BOX
INTERVAL HPI/OVERNIGHT EVENTS: Patient for CABG in am.  To undergo GI evaluation.  Patient performs urethral self-dilation q10 days.  He last dilated about 10 days ago.    MEDICATIONS  (STANDING):  aspirin  chewable 81 milliGRAM(s) Oral daily  atorvastatin 40 milliGRAM(s) Oral at bedtime  chlorhexidine 0.12% Liquid 15 milliLiter(s) Swish and Spit two times a day  chlorhexidine 4% Liquid 1 Application(s) Topical two times a day  enoxaparin Injectable 40 milliGRAM(s) SubCutaneous daily  folic acid 1 milliGRAM(s) Oral daily  isosorbide   mononitrate ER Tablet (IMDUR) 60 milliGRAM(s) Oral daily  magnesium sulfate  IVPB 2 Gram(s) IV Intermittent once  nystatin Powder 1 Application(s) Topical two times a day  pantoprazole    Tablet 40 milliGRAM(s) Oral before breakfast  potassium chloride    Tablet ER 40 milliEquivalent(s) Oral every 4 hours  tamsulosin 0.4 milliGRAM(s) Oral at bedtime    MEDICATIONS  (PRN):  LORazepam     Tablet 1 milliGRAM(s) Oral every 2 hours PRN CIWA-Ar score increase by 2 points and a total score of 7 or less      Allergies    No Known Allergies    Intolerances    OHS diet (Unknown)      Vital Signs Last 24 Hrs  T(C): 36.4 (06 May 2019 06:06), Max: 36.8 (05 May 2019 09:53)  T(F): 97.6 (06 May 2019 06:06), Max: 98.3 (05 May 2019 09:53)  HR: 83 (06 May 2019 07:56) (52 - 83)  BP: 157/88 (06 May 2019 06:06) (130/90 - 180/100)  BP(mean): --  RR: 20 (06 May 2019 06:06) (18 - 20)  SpO2: 98% (06 May 2019 07:56) (92% - 98%)    ROS:  as per HPI     ON PE:  General: Well developed; well nourished; in no acute distress  Head: Normocephalic; atraumatic  Respiratory: No tachypnea  Gastrointestinal: Soft non-tender non-distended;  Genitourinary: No costovertebral angle tenderness.  Urinary bladder is clinically not distended  Extremities: Normal range of motion, No edema  Neurological: Alert and oriented x4  Skin: Warm and dry. No acute rash  Psychiatric: Cooperative and appropriate      LABS:                        12.2   5.6   )-----------( 190      ( 06 May 2019 06:04 )             37.2     05-06    142  |  104  |  19.0  ----------------------------<  91  3.7   |  25.0  |  1.01    Ca    9.0      06 May 2019 06:04  Phos  3.2     05-06  Mg     1.9     05-06    TPro  7.6  /  Alb  3.9  /  TBili  0.8  /  DBili  x   /  AST  17  /  ALT  15  /  AlkPhos  69  05-06          RADIOLOGY & ADDITIONAL TESTS:

## 2019-05-06 NOTE — PROGRESS NOTE ADULT - ASSESSMENT
A/P: Urethral strictures    16 fr gallego placed with difficulty due to tight urethra and strictures  Left to gravity drainage.  Maintain gallego until no longer necessary post-operatively.  Follow up as outpatient.  Will see prn.

## 2019-05-06 NOTE — PROGRESS NOTE ADULT - SUBJECTIVE AND OBJECTIVE BOX
Cardiac Surgery Pre-op Note:  68 year old male with a PMH ETOH, esophageal varacies, HTN, HLD, admitted on  with complaints of CP & SOB, s/p cath found to have CAD which indicated triple vessel disease.     Surgeon: Dr. Soto    Procedure: CABG x3    Allergies    No Known Allergies    Intolerances    OHS diet (Unknown)    PAST MEDICAL & SURGICAL HISTORY:  ETOH abuse  Esophageal varices  Hyperlipidemia  Hypertension  No significant past surgical history      MEDICATIONS  (STANDING):  aspirin  chewable 81 milliGRAM(s) Oral daily  atorvastatin 40 milliGRAM(s) Oral at bedtime  chlorhexidine 0.12% Liquid 15 milliLiter(s) Swish and Spit two times a day  chlorhexidine 4% Liquid 1 Application(s) Topical two times a day  enoxaparin Injectable 40 milliGRAM(s) SubCutaneous daily  folic acid 1 milliGRAM(s) Oral daily  isosorbide   mononitrate ER Tablet (IMDUR) 60 milliGRAM(s) Oral daily  nystatin Powder 1 Application(s) Topical two times a day  pantoprazole    Tablet 40 milliGRAM(s) Oral before breakfast  tamsulosin 0.4 milliGRAM(s) Oral at bedtime    MEDICATIONS  (PRN):  LORazepam     Tablet 1 milliGRAM(s) Oral every 2 hours PRN CIWA-Ar score increase by 2 points and a total score of 7 or less        Labs:                        12.2   5.6   )-----------( 190      ( 06 May 2019 06:04 )             37.2     05-06    142  |  104  |  19.0  ----------------------------<  91  3.7   |  25.0  |  1.01    Ca    9.0      06 May 2019 06:04  Phos  3.2     05-06  Mg     1.9     05-06    TPro  7.6  /  Alb  3.9  /  TBili  0.8  /  DBili  x   /  AST  17  /  ALT  15  /  AlkPhos  69  05-06      LIVER FUNCTIONS - ( 06 May 2019 06:04 )  Alb: 3.9 g/dL / Pro: 7.6 g/dL / ALK PHOS: 69 U/L / ALT: 15 U/L / AST: 17 U/L / GGT: x              CXR:< from: Xray Chest 2 Views PA/Lat (19 @ 12:47) >  EXAM:  XR CHEST PA LAT 2V                          PROCEDURE DATE:  2019          INTERPRETATION:      INDICATION: Chest pain for one week.    PA and lateral views of chest. No previous studies are available for   comparison.    FINDINGS:       The lungs are of equal and symmetric in aeration. The bronchovascular   markings are unremarkable.  There is no acute parenchymal consolidation.    There is no pleural effusion. The cardiomediastinal silhouette is within   normal limits.  There isno acute osseous finding.    IMPRESSION:  Clear lungs.    DIONNE IRIZARRY M.D., ATTENDING RADIOLOGIST  This document has been electronically signed. 2019  1:28PM        < end of copied text >      EKG:< from: 12 Lead ECG (19 @ 07:54) >  Ventricular Rate 53 BPM    Atrial Rate 53 BPM    P-R Interval 172 ms    QRS Duration 82 ms    Q-T Interval 434 ms    QTC Calculation(Bezet) 407 ms    P Axis 22 degrees    R Axis 42 degrees    T Axis 65 degrees    Diagnosis Line Sinus bradycardia  Otherwise normal ECG    Confirmed by TOR FOY ([id number]) on 2019 2:49:20 PM    < end of copied text >      Carotid Duplex:< from: US Duplex Carotid Arteries Complete, Bilateral (19 @ 21:44) >  EXAM:  US DPLX CAROTIDS COMPL BI                          PROCEDURE DATE:  2019          INTERPRETATION:  US CAROTID DUPLEX COMPLETE BILATERAL    HISTORY:  Coronary artery disease. Chest pain. Preoperative.    COMPARISON: None.    PROCEDURE/TECHNIQUE: Examination consists of gray scale and color flow   evaluation of the extracranial carotid arteries from the level of the   proximal common carotid arteries to the level of the visualized portions   of the distal internal carotid arteries and proximal external carotid   arteries with spectral waveform analysis.  Degree of stenosis is   estimated based on the criteria developed by the consensus panel of the   Society of Radiologists in Ultrasound (Radiology; 2003).    FINDINGS:     There is mild plaque in the carotid bulbs.  Peak systolic velocities in   the internal carotid arteries are within normal limits bilaterally.        The peak systolic velocities in cm/sec are as follows:    RIGHT CAROTID:  PROX CCA = 102 cm/s  MIDCCA = 108 cm/s  DIST CCA = 83 cm/s  PROX ICA = 69 cm/s  MID ICA = 81 cm/s  DIST ICA = 76 cm/s  ECA =65 cm/s  ICA/CCA PSV ratio = 1.0    < end of copied text >  < from: US Duplex Carotid Arteries Complete, Bilateral (19 @ 21:44) >  LEFT CAROTID:    PROX CCA = 116 cm/s  MID CCA = 100 cm/s  DIST CCA =  79 cm/s  PROX ICA = 64 cm/s  MID ICA =  73 cm/s  DIST ICA = 55 cm/s  ECA = 58 cm/s  ICA/CCA PSV ratio = 1.    VERTEBRAL ARTERIES: The vertebral arteries demonstrate antegrade flow   bilaterally.    IMPRESSION:    Mild plaque in the carotid bulbs with less than 50% internal carotid   artery stenosis bilaterally.              PFT's:    Echo:< from: TTE Echo Complete w/Doppler (19 @ 11:49) >  EXAM:  ECHO TRANSTHORACIC COMP W DOPP      PROCEDURE DATE:  May  2 2019   .      INTERPRETATION:  REPORT:    TRANSTHORACIC ECHOCARDIOGRAM REPORT         Patient Name:   TERI DEAL Patient Location: [facility] Rec #:  [id number]      Accession #:      [id number]  Account #:                      Height:           68.9 in 175.0 cm  YOB: 1950       Weight:           229.3 lb 104.00 kg  Patient Age:    68 years        BSA:              2.19 m²  Patient Gender: M   BP:               14/82 mmHg       Date of Exam:        2019 11:49:21 AM  Sonographer:         Liliana Grady  Referring Physician: Keisha Sawyer MD    Procedure:     2D Echo/Doppler/Color Doppler Complete.  Indications:   Chest pain, unspecified - R07.9  Diagnosis:     Chest pain, unspecified - R07.9  Study Details: Technically suboptimal study. Study quality was adversely                 affected due to body habitus.         2D AND M-MODE MEASUREMENTS (normal ranges within parentheses):  Left                 Normal   Aorta/Left            Normal  Ventricle:                    Atrium:  IVSd (2D):    1.25  (0.7-1.1) Aortic Root  3.77 cm (2.4-3.7)                 cm             (2D):  LVPWd (2D):   1.30  (0.7-1.1) Left Atrium  3.61 cm(1.9-4.0)                 cm             (2D):  LVIDd (2D):   4.22  (3.4-5.7) LA Volume     24.7    < end of copied text >  < from: TTE Echo Complete w/Doppler (19 @ 11:49) >  cm             Index         ml/m²  LVIDs (2D):   3.08                 cm  LV FS (2D):   27.0   (>25%)                  %  LV EF (2D):   53 %   (>55%)  Relative Wall 0.62   (<0.42)  Thickness    LV SYSTOLIC FUNCTION BY 2D PLANIMETRY (MOD):  EF-A4C View: 52.4 % EF-A2C View: 50.6 % EF-Biplane: 51 %    LV DIASTOLIC FUNCTION:  MV Peak E: 0.64 m/s e', MV Kaitlyn: 0.06 m/s  MV Peak A: 0.42 m/s E/e' Ratio: 11.12  E/A Ratio: 1.52    SPECTRAL DOPPLER ANALYSIS (where applicable):  Aortic Valve: AoV Max Carlito: 1.18 m/s AoV Peak P.6 mmHg AoV Mean PG:   3.0 mmHg      < from: TTE Echo Complete w/Doppler (19 @ 11:49) >  LVOT Vmax: 0.93 m/s LVOT VTI: 0.195 m LVOT Diameter: 2.23 cm    AoV Area, Vmax: 3.08 cm² AoV Area, VTI: 3.28 cm² AoV Area, Vmn: 3.37 cm²  Ao VTI: 0.232  Tricuspid Valve and PA/RV Systolic Pressure: TR Max Velocity:  RA   Pressure: 3 mmHg RVSP/PASP:       PHYSICIAN INTERPRETATION:  Left Ventricle: Endocardial visualization was enhanced with intravenous   echo contrast. The left ventricular internal cavity size is normal. Left   ventricular wall thickness is mildly increased.  Global LV systolic function was normal. Left ventricular ejection   fraction, by visual estimation, is 50 to 55%. Spectral Doppler shows   impaired relaxation pattern of left ventricular myocardial filling (Grade   I diastolic dysfunction).       LV Wall Scoring:  The apical anterior segment, apical inferior segment, and apex are  hypokinetic.    < from: TTE Echo Complete w/Doppler (19 @ 11:49) >  Right Ventricle: Normal right ventricular size and function.  Left Atrium: The left atrium is normal in size. Normal left atrial size.  Right Atrium: The right atrium is normal in size. Normal right atrial   size.  Pericardium: There is no evidence of pericardial effusion.  Mitral Valve: The mitral valve is normal in structure. Mitral leaflet   mobility is normal. Trace mitral valve regurgitation is seen.  Tricuspid Valve: The tricuspid valve is normal in structure. Mild   tricuspid regurgitation is visualized.  Aortic Valve: Normal trileaflet aortic valve with normal opening. The   aortic valve is trileaflet. Peak transaortic gradient equals 5.6 mmHg,   mean transaortic gradient equals 3.0 mmHg, the calculated aortic valve   area equals 3.28 cm² bythe continuity equation consistent with normally   opening aortic valve. No evidence of aortic valve regurgitation is seen.  Pulmonic Valve: Structurally normal pulmonic valve, with normal leaflet   excursion. The pulmonic valve is normal. Trace pulmonic valve   regurgitation.  Aorta: The aortic root and ascending aorta are structurally normal, with   no evidence of dilitation.  Pulmonary Artery: The main pulmonary artery is normal in size.  Venous: The inferior vena cava was normal sized, with respiratory size   < from: TTE Echo Complete w/Doppler (19 @ 11:49) >  variation greater than 50%.       Summary:   1. Left ventricular ejection fraction, by visual estimation, is 50 to   55%.   2. Normal global left ventricular systolic function.   3. Apical anterior segment, apical inferior segment, and apex are   abnormal as described above.   4. Spectral Doppler shows impaired relaxation pattern of left   ventricular myocardial filling (Grade I diastolic dysfunction).   5. There is mild concentric left ventricular hypertrophy.   6. Endocardial visualization was enhanced with intravenous echo contrast.    Cath report:< from: Cardiac Cath Lab - Adult (19 @ 17:22) >  [facility]  Department of Cardiology  [address] [place] [address]  [phone]  Cath Lab Report -- Comprehensive Report  Patient: TERI DEAL  Study date: 2019  Account number: [id number]  MR number: [id number]  :1950    < end of copied text >  < from: Cardiac Cath Lab - Adult (19 @ 17:22) >  NDICATIONS: Unstable angina - CCS3.  HISTORY: Patient presents to ER with UA  CTA with suggestion of stenosis in LAD and high claciumscore  PROCEDURE:  --  Left coronary angiography.  --  Right coronary angiography.  Local anesthetic given. Right radial artery access. Left coronary artery  angiography. The vessel was injected utilizing a catheter. Right coronary  artery angiography. The vessel was injected utilizing a catheter.  RADIATION EXPOSURE: 4.5 min.  CONTRAST GIVEN: Omnipaque 45 ml.  MEDICATIONS GIVEN: Midazolam, 2 mg, IV. Fentanyl, 50 mcg, IV. Heparin, 3000  units, IV. 1% Lidocaine, 10 ml, subcutaneously. Cardene, 1,000 mcg. 0.9NS,  100 ml, IV.  CORONARY VESSELS: The coronary circulation is right dominant.  LAD:   --  Proximal LAD: There was a tubular 90 % stenosis. The lesion was  irregularly contoured and calcified.  CX:   --  Mid circumflex: There was a [id number] % stenosis. The lesion was  irregularly contoured and ulcerated.  RCA:   --  Distal RCA: There was a 70 % stenosis.  COMPLICATIONS: No complications occurred during the cath lab visit.  DIAGNOSTIC RECOMMENDATIONS: Patient with abnormal CTA and UA  Cath reveals significant stenosis of the pLAD, mLCx and dRCA.        PHYSICAL EXAM  General: well nourished, well developed, no acute distress  Neurology: alert and oriented x 3, nonfocal, no gross deficits  Respiratory: clear to auscultation bilaterally  CV: regular rate and rhythm, normal S1, S2  Abdomen: soft, obese, nontender, nondistended, positive bowel sounds, last bowel movement 19  Extremities: warm, well perfused. Trace edema x2BLE. + DP pulses        Pt has AICD/PPM [ ] Yes  [X] No             Brand Name:  Pre-op Beta Blocker ordered within 24 hrs of surgery?  [ ] Yes  [x] No  If not, Why? Pt. bradycardiac, lopressor D/C as per Dr. Soto  Pre-op Hibiclens & Peridex (BID x 2 days preferred) [ ] Yes  [ ] No  Type & Cross  [x] Yes  [ ] No  NPO after Midnight [ ] Yes  [x] No  Pre-op ABX ordered, to be taped on chart:  [x] Yes  [ ] No   ( Vanco only if Anaphylaxis, or MRSA)  Consent obtained  [x] Yes  [ ] No

## 2019-05-06 NOTE — CONSULT NOTE ADULT - ASSESSMENT
Fatty liver only, likely Alcoholic Fatty liver and obesity related.  No cirrhosis on imaging or by labs.  Normal Platelets, Albumin, LFT's and coags.    No hx of alcoholism.  Fatty liver alone should not increase the portal pressuers.    Liver size is commensurate with body habitus and therefore not pathologic.    Source for GI bleeding 10 yrs ago is obscure.  Not varices by history.    Recent EGD/ Colonoscopy were both negative 3 yrs ago by report of patient.      No significant GI disease or liver disease.  GI cleared for Cardiac surgery. No special precautions.   Weight reduction / exercise adviseable post recovery from surgery.  .

## 2019-05-07 ENCOUNTER — APPOINTMENT (OUTPATIENT)
Dept: CARDIOTHORACIC SURGERY | Facility: HOSPITAL | Age: 69
End: 2019-05-07

## 2019-05-07 LAB
ALBUMIN SERPL ELPH-MCNC: 3.4 G/DL — SIGNIFICANT CHANGE UP (ref 3.3–5.2)
ALP SERPL-CCNC: 65 U/L — SIGNIFICANT CHANGE UP (ref 40–120)
ALT FLD-CCNC: 18 U/L — SIGNIFICANT CHANGE UP
ANION GAP SERPL CALC-SCNC: 13 MMOL/L — SIGNIFICANT CHANGE UP (ref 5–17)
APTT BLD: 32.3 SEC — SIGNIFICANT CHANGE UP (ref 27.5–36.3)
AST SERPL-CCNC: 34 U/L — SIGNIFICANT CHANGE UP
BASOPHILS # BLD AUTO: 0 K/UL — SIGNIFICANT CHANGE UP (ref 0–0.2)
BASOPHILS NFR BLD AUTO: 0.1 % — SIGNIFICANT CHANGE UP (ref 0–2)
BILIRUB SERPL-MCNC: 1.1 MG/DL — SIGNIFICANT CHANGE UP (ref 0.4–2)
BUN SERPL-MCNC: 14 MG/DL — SIGNIFICANT CHANGE UP (ref 8–20)
CALCIUM SERPL-MCNC: 8.8 MG/DL — SIGNIFICANT CHANGE UP (ref 8.6–10.2)
CHLORIDE SERPL-SCNC: 103 MMOL/L — SIGNIFICANT CHANGE UP (ref 98–107)
CK MB CFR SERPL CALC: 15.2 NG/ML — HIGH (ref 0–6.7)
CK SERPL-CCNC: 161 U/L — SIGNIFICANT CHANGE UP (ref 30–200)
CO2 SERPL-SCNC: 21 MMOL/L — LOW (ref 22–29)
CREAT SERPL-MCNC: 0.88 MG/DL — SIGNIFICANT CHANGE UP (ref 0.5–1.3)
EOSINOPHIL # BLD AUTO: 0 K/UL — SIGNIFICANT CHANGE UP (ref 0–0.5)
EOSINOPHIL NFR BLD AUTO: 0.1 % — SIGNIFICANT CHANGE UP (ref 0–5)
GAS PNL BLDA: SIGNIFICANT CHANGE UP
GLUCOSE BLDC GLUCOMTR-MCNC: 143 MG/DL — HIGH (ref 70–99)
GLUCOSE BLDC GLUCOMTR-MCNC: 148 MG/DL — HIGH (ref 70–99)
GLUCOSE BLDC GLUCOMTR-MCNC: 197 MG/DL — HIGH (ref 70–99)
GLUCOSE BLDC GLUCOMTR-MCNC: 229 MG/DL — HIGH (ref 70–99)
GLUCOSE BLDC GLUCOMTR-MCNC: 230 MG/DL — HIGH (ref 70–99)
GLUCOSE BLDC GLUCOMTR-MCNC: 239 MG/DL — HIGH (ref 70–99)
GLUCOSE SERPL-MCNC: 198 MG/DL — HIGH (ref 70–115)
HCT VFR BLD CALC: 35.9 % — LOW (ref 42–52)
HGB BLD-MCNC: 11.4 G/DL — LOW (ref 14–18)
INR BLD: 1.41 RATIO — HIGH (ref 0.88–1.16)
LYMPHOCYTES # BLD AUTO: 1.6 K/UL — SIGNIFICANT CHANGE UP (ref 1–4.8)
LYMPHOCYTES # BLD AUTO: 9.2 % — LOW (ref 20–55)
MAGNESIUM SERPL-MCNC: 2.1 MG/DL — SIGNIFICANT CHANGE UP (ref 1.6–2.6)
MCHC RBC-ENTMCNC: 22.9 PG — LOW (ref 27–31)
MCHC RBC-ENTMCNC: 31.8 G/DL — LOW (ref 32–36)
MCV RBC AUTO: 72.1 FL — LOW (ref 80–94)
MONOCYTES # BLD AUTO: 1 K/UL — HIGH (ref 0–0.8)
MONOCYTES NFR BLD AUTO: 5.9 % — SIGNIFICANT CHANGE UP (ref 3–10)
NEUTROPHILS # BLD AUTO: 15 K/UL — HIGH (ref 1.8–8)
NEUTROPHILS NFR BLD AUTO: 84.4 % — HIGH (ref 37–73)
PHOSPHATE SERPL-MCNC: 3 MG/DL — SIGNIFICANT CHANGE UP (ref 2.4–4.7)
PLATELET # BLD AUTO: 151 K/UL — SIGNIFICANT CHANGE UP (ref 150–400)
POTASSIUM SERPL-MCNC: 4.3 MMOL/L — SIGNIFICANT CHANGE UP (ref 3.5–5.3)
POTASSIUM SERPL-SCNC: 4.3 MMOL/L — SIGNIFICANT CHANGE UP (ref 3.5–5.3)
PROT SERPL-MCNC: 6.4 G/DL — LOW (ref 6.6–8.7)
PROTHROM AB SERPL-ACNC: 16.4 SEC — HIGH (ref 10–12.9)
RBC # BLD: 4.98 M/UL — SIGNIFICANT CHANGE UP (ref 4.6–6.2)
RBC # FLD: 15.2 % — SIGNIFICANT CHANGE UP (ref 11–15.6)
SODIUM SERPL-SCNC: 137 MMOL/L — SIGNIFICANT CHANGE UP (ref 135–145)
TROPONIN T SERPL-MCNC: 0.31 NG/ML — HIGH (ref 0–0.06)
WBC # BLD: 17.8 K/UL — HIGH (ref 4.8–10.8)
WBC # FLD AUTO: 17.8 K/UL — HIGH (ref 4.8–10.8)

## 2019-05-07 PROCEDURE — 71045 X-RAY EXAM CHEST 1 VIEW: CPT | Mod: 26

## 2019-05-07 PROCEDURE — 93010 ELECTROCARDIOGRAM REPORT: CPT

## 2019-05-07 PROCEDURE — 76998 US GUIDE INTRAOP: CPT | Mod: 26,59

## 2019-05-07 PROCEDURE — 33535 CABG ARTERIAL THREE: CPT | Mod: AS

## 2019-05-07 PROCEDURE — 33535 CABG ARTERIAL THREE: CPT

## 2019-05-07 RX ORDER — FENTANYL CITRATE 50 UG/ML
50 INJECTION INTRAVENOUS ONCE
Qty: 0 | Refills: 0 | Status: DISCONTINUED | OUTPATIENT
Start: 2019-05-07 | End: 2019-05-07

## 2019-05-07 RX ORDER — CHLORHEXIDINE GLUCONATE 213 G/1000ML
5 SOLUTION TOPICAL EVERY 4 HOURS
Qty: 0 | Refills: 0 | Status: DISCONTINUED | OUTPATIENT
Start: 2019-05-07 | End: 2019-05-07

## 2019-05-07 RX ORDER — SODIUM CHLORIDE 9 MG/ML
250 INJECTION, SOLUTION INTRAVENOUS ONCE
Qty: 0 | Refills: 0 | Status: COMPLETED | OUTPATIENT
Start: 2019-05-07 | End: 2019-05-07

## 2019-05-07 RX ORDER — ALBUMIN HUMAN 25 %
250 VIAL (ML) INTRAVENOUS ONCE
Qty: 0 | Refills: 0 | Status: COMPLETED | OUTPATIENT
Start: 2019-05-07 | End: 2019-05-07

## 2019-05-07 RX ORDER — DOCUSATE SODIUM 100 MG
100 CAPSULE ORAL THREE TIMES A DAY
Qty: 0 | Refills: 0 | Status: DISCONTINUED | OUTPATIENT
Start: 2019-05-07 | End: 2019-05-12

## 2019-05-07 RX ORDER — PANTOPRAZOLE SODIUM 20 MG/1
40 TABLET, DELAYED RELEASE ORAL
Qty: 0 | Refills: 0 | Status: DISCONTINUED | OUTPATIENT
Start: 2019-05-08 | End: 2019-05-12

## 2019-05-07 RX ORDER — ACETAMINOPHEN 500 MG
1000 TABLET ORAL ONCE
Qty: 0 | Refills: 0 | Status: COMPLETED | OUTPATIENT
Start: 2019-05-07 | End: 2019-05-07

## 2019-05-07 RX ORDER — SODIUM CHLORIDE 9 MG/ML
1000 INJECTION INTRAMUSCULAR; INTRAVENOUS; SUBCUTANEOUS
Qty: 0 | Refills: 0 | Status: DISCONTINUED | OUTPATIENT
Start: 2019-05-07 | End: 2019-05-11

## 2019-05-07 RX ORDER — DILTIAZEM HCL 120 MG
2.5 CAPSULE, EXT RELEASE 24 HR ORAL
Qty: 125 | Refills: 0 | Status: DISCONTINUED | OUTPATIENT
Start: 2019-05-07 | End: 2019-05-08

## 2019-05-07 RX ORDER — NOREPINEPHRINE BITARTRATE/D5W 8 MG/250ML
0.05 PLASTIC BAG, INJECTION (ML) INTRAVENOUS
Qty: 8 | Refills: 0 | Status: DISCONTINUED | OUTPATIENT
Start: 2019-05-07 | End: 2019-05-08

## 2019-05-07 RX ORDER — ASPIRIN/CALCIUM CARB/MAGNESIUM 324 MG
325 TABLET ORAL DAILY
Qty: 0 | Refills: 0 | Status: DISCONTINUED | OUTPATIENT
Start: 2019-05-08 | End: 2019-05-12

## 2019-05-07 RX ORDER — INSULIN HUMAN 100 [IU]/ML
2 INJECTION, SOLUTION SUBCUTANEOUS
Qty: 50 | Refills: 0 | Status: DISCONTINUED | OUTPATIENT
Start: 2019-05-07 | End: 2019-05-09

## 2019-05-07 RX ORDER — ASPIRIN/CALCIUM CARB/MAGNESIUM 324 MG
325 TABLET ORAL ONCE
Qty: 0 | Refills: 0 | Status: COMPLETED | OUTPATIENT
Start: 2019-05-07 | End: 2019-05-07

## 2019-05-07 RX ORDER — DEXTROSE 50 % IN WATER 50 %
25 SYRINGE (ML) INTRAVENOUS
Qty: 0 | Refills: 0 | Status: DISCONTINUED | OUTPATIENT
Start: 2019-05-07 | End: 2019-05-11

## 2019-05-07 RX ORDER — PROPOFOL 10 MG/ML
7.87 INJECTION, EMULSION INTRAVENOUS
Qty: 1000 | Refills: 0 | Status: DISCONTINUED | OUTPATIENT
Start: 2019-05-07 | End: 2019-05-08

## 2019-05-07 RX ORDER — POTASSIUM CHLORIDE 20 MEQ
10 PACKET (EA) ORAL
Qty: 0 | Refills: 0 | Status: DISCONTINUED | OUTPATIENT
Start: 2019-05-07 | End: 2019-05-08

## 2019-05-07 RX ORDER — VANCOMYCIN HCL 1 G
1000 VIAL (EA) INTRAVENOUS EVERY 12 HOURS
Qty: 0 | Refills: 0 | Status: COMPLETED | OUTPATIENT
Start: 2019-05-07 | End: 2019-05-09

## 2019-05-07 RX ORDER — CEFUROXIME AXETIL 250 MG
1500 TABLET ORAL EVERY 8 HOURS
Qty: 0 | Refills: 0 | Status: COMPLETED | OUTPATIENT
Start: 2019-05-07 | End: 2019-05-09

## 2019-05-07 RX ORDER — ONDANSETRON 8 MG/1
4 TABLET, FILM COATED ORAL ONCE
Qty: 0 | Refills: 0 | Status: COMPLETED | OUTPATIENT
Start: 2019-05-07 | End: 2019-05-07

## 2019-05-07 RX ORDER — POTASSIUM CHLORIDE 20 MEQ
10 PACKET (EA) ORAL
Qty: 0 | Refills: 0 | Status: COMPLETED | OUTPATIENT
Start: 2019-05-07 | End: 2019-05-07

## 2019-05-07 RX ORDER — DILTIAZEM HCL 120 MG
30 CAPSULE, EXT RELEASE 24 HR ORAL THREE TIMES A DAY
Qty: 0 | Refills: 0 | Status: DISCONTINUED | OUTPATIENT
Start: 2019-05-08 | End: 2019-05-12

## 2019-05-07 RX ORDER — PANTOPRAZOLE SODIUM 20 MG/1
40 TABLET, DELAYED RELEASE ORAL ONCE
Qty: 0 | Refills: 0 | Status: COMPLETED | OUTPATIENT
Start: 2019-05-07 | End: 2019-05-07

## 2019-05-07 RX ORDER — CHLORHEXIDINE GLUCONATE 213 G/1000ML
1 SOLUTION TOPICAL DAILY
Qty: 0 | Refills: 0 | Status: DISCONTINUED | OUTPATIENT
Start: 2019-05-07 | End: 2019-05-12

## 2019-05-07 RX ORDER — ATORVASTATIN CALCIUM 80 MG/1
40 TABLET, FILM COATED ORAL AT BEDTIME
Qty: 0 | Refills: 0 | Status: DISCONTINUED | OUTPATIENT
Start: 2019-05-07 | End: 2019-05-12

## 2019-05-07 RX ORDER — DEXTROSE 50 % IN WATER 50 %
50 SYRINGE (ML) INTRAVENOUS
Qty: 0 | Refills: 0 | Status: DISCONTINUED | OUTPATIENT
Start: 2019-05-07 | End: 2019-05-11

## 2019-05-07 RX ADMIN — Medication 325 MILLIGRAM(S): at 22:47

## 2019-05-07 RX ADMIN — PROPOFOL 5 MICROGRAM(S)/KG/MIN: 10 INJECTION, EMULSION INTRAVENOUS at 15:00

## 2019-05-07 RX ADMIN — Medication 125 MILLILITER(S): at 22:18

## 2019-05-07 RX ADMIN — FENTANYL CITRATE 50 MICROGRAM(S): 50 INJECTION INTRAVENOUS at 22:38

## 2019-05-07 RX ADMIN — CHLORHEXIDINE GLUCONATE 1 APPLICATION(S): 213 SOLUTION TOPICAL at 19:52

## 2019-05-07 RX ADMIN — SODIUM CHLORIDE 1500 MILLILITER(S): 9 INJECTION, SOLUTION INTRAVENOUS at 16:30

## 2019-05-07 RX ADMIN — CHLORHEXIDINE GLUCONATE 15 MILLILITER(S): 213 SOLUTION TOPICAL at 05:52

## 2019-05-07 RX ADMIN — Medication 100 MILLIEQUIVALENT(S): at 15:01

## 2019-05-07 RX ADMIN — ATORVASTATIN CALCIUM 40 MILLIGRAM(S): 80 TABLET, FILM COATED ORAL at 22:18

## 2019-05-07 RX ADMIN — Medication 100 MILLIEQUIVALENT(S): at 14:30

## 2019-05-07 RX ADMIN — CHLORHEXIDINE GLUCONATE 5 MILLILITER(S): 213 SOLUTION TOPICAL at 14:59

## 2019-05-07 RX ADMIN — Medication 400 MILLIGRAM(S): at 18:46

## 2019-05-07 RX ADMIN — INSULIN HUMAN 2 UNIT(S)/HR: 100 INJECTION, SOLUTION SUBCUTANEOUS at 16:14

## 2019-05-07 RX ADMIN — Medication 100 MILLIGRAM(S): at 16:14

## 2019-05-07 RX ADMIN — Medication 100 MILLIGRAM(S): at 22:18

## 2019-05-07 RX ADMIN — Medication 750 MILLILITER(S): at 18:56

## 2019-05-07 RX ADMIN — FENTANYL CITRATE 50 MICROGRAM(S): 50 INJECTION INTRAVENOUS at 22:23

## 2019-05-07 RX ADMIN — Medication 125 MILLILITER(S): at 16:15

## 2019-05-07 RX ADMIN — Medication 250 MILLIGRAM(S): at 19:46

## 2019-05-07 RX ADMIN — ONDANSETRON 4 MILLIGRAM(S): 8 TABLET, FILM COATED ORAL at 19:38

## 2019-05-07 RX ADMIN — PANTOPRAZOLE SODIUM 40 MILLIGRAM(S): 20 TABLET, DELAYED RELEASE ORAL at 14:59

## 2019-05-07 RX ADMIN — FENTANYL CITRATE 50 MICROGRAM(S): 50 INJECTION INTRAVENOUS at 16:50

## 2019-05-07 RX ADMIN — PANTOPRAZOLE SODIUM 40 MILLIGRAM(S): 20 TABLET, DELAYED RELEASE ORAL at 05:52

## 2019-05-07 RX ADMIN — Medication 1000 MILLIGRAM(S): at 19:27

## 2019-05-07 RX ADMIN — CHLORHEXIDINE GLUCONATE 1 APPLICATION(S): 213 SOLUTION TOPICAL at 05:42

## 2019-05-07 RX ADMIN — FENTANYL CITRATE 50 MICROGRAM(S): 50 INJECTION INTRAVENOUS at 16:20

## 2019-05-07 NOTE — BRIEF OPERATIVE NOTE - NSICDXBRIEFPROCEDURE_GEN_ALL_CORE_FT
PROCEDURES:  Transesophageal echocardiography (ANDREW) 07-May-2019 13:07:25  Narciso Guajardo  CABG, using 3 arterial grafts 07-May-2019 13:06:48  Narciso Guajardo

## 2019-05-07 NOTE — BRIEF OPERATIVE NOTE - ADULT CT SURG CONDUIT HARVEST PERFORMED BY
Left radial, T. Gresham, Left lower extremity exploration, MERY Guajardo Left radial, FREDDY Gresham PA-C, Left lower extremity exploration, MERY Guajardo PA-C

## 2019-05-07 NOTE — PRE-OP CHECKLIST - SELECT TESTS ORDERED
Urinalysis/EKG/CBC/CXR/CMP/Hepatic Function/Type and Cross BMP/CXR/CMP/Hepatic Function/Type and Screen/Urinalysis/EKG/CBC/Results in MD note

## 2019-05-07 NOTE — PHARMACOTHERAPY INTERVENTION NOTE - COMMENTS
Patient on pantoprazole 40mg po for GI prophylaxis. Patient currently intubated post operative, will change to pantoprazole suspension for administration down feeding tube.

## 2019-05-07 NOTE — BRIEF OPERATIVE NOTE - ASSISTANT(S)
Mary BELL, Narciso Guajardo PA-C, Patricio Gresham PA-C Rosita Blas PA-C, Narciso Guajardo PA-C, Patricio Gresham PA-C

## 2019-05-07 NOTE — BRIEF OPERATIVE NOTE - OPERATION/FINDINGS
CAD.  CABG x 3 performed utilizing bilateral SORAIDA's and left radial artery.  Left SORAIDA anastomosis to LAD and left radial artery anastomosis to OM1 on CPB.  Right SORAIDA anastomosis to PDA performed off pump.  Left lower extremity explored for vein conduit, however GSV was sclerotic and not suitable . CAD.  CABG x 3 performed utilizing bilateral SORAIDA's and left radial artery.  Left SORAIDA anastomosis to LAD and left radial artery anastomosis to OM1 on CPB.  Right SORAIDA anastomosis to PDA performed off bypass.  Left lower extremity explored for vein conduit, however GSV was sclerotic and not suitable.

## 2019-05-08 LAB
ANION GAP SERPL CALC-SCNC: 11 MMOL/L — SIGNIFICANT CHANGE UP (ref 5–17)
APTT BLD: 27.5 SEC — SIGNIFICANT CHANGE UP (ref 27.5–36.3)
BUN SERPL-MCNC: 18 MG/DL — SIGNIFICANT CHANGE UP (ref 8–20)
CALCIUM SERPL-MCNC: 8.3 MG/DL — LOW (ref 8.6–10.2)
CHLORIDE SERPL-SCNC: 104 MMOL/L — SIGNIFICANT CHANGE UP (ref 98–107)
CO2 SERPL-SCNC: 23 MMOL/L — SIGNIFICANT CHANGE UP (ref 22–29)
CREAT SERPL-MCNC: 1.04 MG/DL — SIGNIFICANT CHANGE UP (ref 0.5–1.3)
GLUCOSE BLDC GLUCOMTR-MCNC: 126 MG/DL — HIGH (ref 70–99)
GLUCOSE BLDC GLUCOMTR-MCNC: 128 MG/DL — HIGH (ref 70–99)
GLUCOSE BLDC GLUCOMTR-MCNC: 129 MG/DL — HIGH (ref 70–99)
GLUCOSE BLDC GLUCOMTR-MCNC: 130 MG/DL — HIGH (ref 70–99)
GLUCOSE BLDC GLUCOMTR-MCNC: 136 MG/DL — HIGH (ref 70–99)
GLUCOSE BLDC GLUCOMTR-MCNC: 138 MG/DL — HIGH (ref 70–99)
GLUCOSE BLDC GLUCOMTR-MCNC: 142 MG/DL — HIGH (ref 70–99)
GLUCOSE BLDC GLUCOMTR-MCNC: 147 MG/DL — HIGH (ref 70–99)
GLUCOSE BLDC GLUCOMTR-MCNC: 148 MG/DL — HIGH (ref 70–99)
GLUCOSE BLDC GLUCOMTR-MCNC: 149 MG/DL — HIGH (ref 70–99)
GLUCOSE BLDC GLUCOMTR-MCNC: 152 MG/DL — HIGH (ref 70–99)
GLUCOSE BLDC GLUCOMTR-MCNC: 152 MG/DL — HIGH (ref 70–99)
GLUCOSE BLDC GLUCOMTR-MCNC: 153 MG/DL — HIGH (ref 70–99)
GLUCOSE BLDC GLUCOMTR-MCNC: 157 MG/DL — HIGH (ref 70–99)
GLUCOSE BLDC GLUCOMTR-MCNC: 177 MG/DL — HIGH (ref 70–99)
GLUCOSE SERPL-MCNC: 136 MG/DL — HIGH (ref 70–115)
HCT VFR BLD CALC: 28.4 % — LOW (ref 42–52)
HGB BLD-MCNC: 9.1 G/DL — LOW (ref 14–18)
INR BLD: 1.29 RATIO — HIGH (ref 0.88–1.16)
MAGNESIUM SERPL-MCNC: 1.9 MG/DL — SIGNIFICANT CHANGE UP (ref 1.6–2.6)
MCHC RBC-ENTMCNC: 23.1 PG — LOW (ref 27–31)
MCHC RBC-ENTMCNC: 32 G/DL — SIGNIFICANT CHANGE UP (ref 32–36)
MCV RBC AUTO: 72.1 FL — LOW (ref 80–94)
PHOSPHATE SERPL-MCNC: 2.8 MG/DL — SIGNIFICANT CHANGE UP (ref 2.4–4.7)
PLATELET # BLD AUTO: 170 K/UL — SIGNIFICANT CHANGE UP (ref 150–400)
POTASSIUM SERPL-MCNC: 4.5 MMOL/L — SIGNIFICANT CHANGE UP (ref 3.5–5.3)
POTASSIUM SERPL-SCNC: 4.5 MMOL/L — SIGNIFICANT CHANGE UP (ref 3.5–5.3)
PROTHROM AB SERPL-ACNC: 14.9 SEC — HIGH (ref 10–12.9)
RBC # BLD: 3.94 M/UL — LOW (ref 4.6–6.2)
RBC # FLD: 15.4 % — SIGNIFICANT CHANGE UP (ref 11–15.6)
SODIUM SERPL-SCNC: 138 MMOL/L — SIGNIFICANT CHANGE UP (ref 135–145)
TROPONIN T SERPL-MCNC: 0.28 NG/ML — HIGH (ref 0–0.06)
WBC # BLD: 14.2 K/UL — HIGH (ref 4.8–10.8)
WBC # FLD AUTO: 14.2 K/UL — HIGH (ref 4.8–10.8)

## 2019-05-08 PROCEDURE — 93010 ELECTROCARDIOGRAM REPORT: CPT

## 2019-05-08 PROCEDURE — 71045 X-RAY EXAM CHEST 1 VIEW: CPT | Mod: 26

## 2019-05-08 RX ORDER — HYDROMORPHONE HYDROCHLORIDE 2 MG/ML
0.5 INJECTION INTRAMUSCULAR; INTRAVENOUS; SUBCUTANEOUS ONCE
Qty: 0 | Refills: 0 | Status: DISCONTINUED | OUTPATIENT
Start: 2019-05-08 | End: 2019-05-08

## 2019-05-08 RX ORDER — DEXTROSE 50 % IN WATER 50 %
15 SYRINGE (ML) INTRAVENOUS ONCE
Qty: 0 | Refills: 0 | Status: DISCONTINUED | OUTPATIENT
Start: 2019-05-08 | End: 2019-05-11

## 2019-05-08 RX ORDER — DEXTROSE 50 % IN WATER 50 %
12.5 SYRINGE (ML) INTRAVENOUS ONCE
Qty: 0 | Refills: 0 | Status: DISCONTINUED | OUTPATIENT
Start: 2019-05-08 | End: 2019-05-11

## 2019-05-08 RX ORDER — MAGNESIUM SULFATE 500 MG/ML
2 VIAL (ML) INJECTION ONCE
Qty: 0 | Refills: 0 | Status: COMPLETED | OUTPATIENT
Start: 2019-05-08 | End: 2019-05-08

## 2019-05-08 RX ORDER — OXYCODONE AND ACETAMINOPHEN 5; 325 MG/1; MG/1
1 TABLET ORAL EVERY 4 HOURS
Qty: 0 | Refills: 0 | Status: DISCONTINUED | OUTPATIENT
Start: 2019-05-08 | End: 2019-05-12

## 2019-05-08 RX ORDER — INSULIN GLARGINE 100 [IU]/ML
8 INJECTION, SOLUTION SUBCUTANEOUS ONCE
Qty: 0 | Refills: 0 | Status: COMPLETED | OUTPATIENT
Start: 2019-05-08 | End: 2019-05-08

## 2019-05-08 RX ORDER — GEMFIBROZIL 600 MG
600 TABLET ORAL
Qty: 0 | Refills: 0 | Status: DISCONTINUED | OUTPATIENT
Start: 2019-05-08 | End: 2019-05-12

## 2019-05-08 RX ORDER — OXYCODONE AND ACETAMINOPHEN 5; 325 MG/1; MG/1
2 TABLET ORAL EVERY 4 HOURS
Qty: 0 | Refills: 0 | Status: DISCONTINUED | OUTPATIENT
Start: 2019-05-08 | End: 2019-05-12

## 2019-05-08 RX ORDER — GLUCAGON INJECTION, SOLUTION 0.5 MG/.1ML
1 INJECTION, SOLUTION SUBCUTANEOUS ONCE
Qty: 0 | Refills: 0 | Status: DISCONTINUED | OUTPATIENT
Start: 2019-05-08 | End: 2019-05-12

## 2019-05-08 RX ORDER — SENNA PLUS 8.6 MG/1
2 TABLET ORAL AT BEDTIME
Qty: 0 | Refills: 0 | Status: DISCONTINUED | OUTPATIENT
Start: 2019-05-08 | End: 2019-05-12

## 2019-05-08 RX ORDER — NOREPINEPHRINE BITARTRATE/D5W 8 MG/250ML
0.05 PLASTIC BAG, INJECTION (ML) INTRAVENOUS
Qty: 8 | Refills: 0 | Status: DISCONTINUED | OUTPATIENT
Start: 2019-05-08 | End: 2019-05-08

## 2019-05-08 RX ORDER — DEXTROSE 50 % IN WATER 50 %
25 SYRINGE (ML) INTRAVENOUS ONCE
Qty: 0 | Refills: 0 | Status: DISCONTINUED | OUTPATIENT
Start: 2019-05-08 | End: 2019-05-11

## 2019-05-08 RX ORDER — POLYETHYLENE GLYCOL 3350 17 G/17G
17 POWDER, FOR SOLUTION ORAL DAILY
Qty: 0 | Refills: 0 | Status: DISCONTINUED | OUTPATIENT
Start: 2019-05-08 | End: 2019-05-12

## 2019-05-08 RX ORDER — FUROSEMIDE 40 MG
20 TABLET ORAL EVERY 12 HOURS
Qty: 0 | Refills: 0 | Status: DISCONTINUED | OUTPATIENT
Start: 2019-05-08 | End: 2019-05-09

## 2019-05-08 RX ORDER — INSULIN LISPRO 100/ML
VIAL (ML) SUBCUTANEOUS
Qty: 0 | Refills: 0 | Status: DISCONTINUED | OUTPATIENT
Start: 2019-05-08 | End: 2019-05-11

## 2019-05-08 RX ORDER — ONDANSETRON 8 MG/1
4 TABLET, FILM COATED ORAL ONCE
Qty: 0 | Refills: 0 | Status: COMPLETED | OUTPATIENT
Start: 2019-05-08 | End: 2019-05-08

## 2019-05-08 RX ORDER — ENOXAPARIN SODIUM 100 MG/ML
40 INJECTION SUBCUTANEOUS DAILY
Qty: 0 | Refills: 0 | Status: DISCONTINUED | OUTPATIENT
Start: 2019-05-08 | End: 2019-05-12

## 2019-05-08 RX ORDER — ACETAMINOPHEN 500 MG
1000 TABLET ORAL ONCE
Qty: 0 | Refills: 0 | Status: COMPLETED | OUTPATIENT
Start: 2019-05-08 | End: 2019-05-08

## 2019-05-08 RX ORDER — INSULIN LISPRO 100/ML
2 VIAL (ML) SUBCUTANEOUS
Qty: 0 | Refills: 0 | Status: DISCONTINUED | OUTPATIENT
Start: 2019-05-08 | End: 2019-05-10

## 2019-05-08 RX ORDER — METOPROLOL TARTRATE 50 MG
25 TABLET ORAL
Qty: 0 | Refills: 0 | Status: DISCONTINUED | OUTPATIENT
Start: 2019-05-08 | End: 2019-05-12

## 2019-05-08 RX ORDER — SODIUM CHLORIDE 9 MG/ML
1000 INJECTION, SOLUTION INTRAVENOUS
Qty: 0 | Refills: 0 | Status: DISCONTINUED | OUTPATIENT
Start: 2019-05-08 | End: 2019-05-11

## 2019-05-08 RX ADMIN — CHLORHEXIDINE GLUCONATE 1 APPLICATION(S): 213 SOLUTION TOPICAL at 11:02

## 2019-05-08 RX ADMIN — INSULIN GLARGINE 8 UNIT(S): 100 INJECTION, SOLUTION SUBCUTANEOUS at 23:28

## 2019-05-08 RX ADMIN — Medication 1000 MILLIGRAM(S): at 09:05

## 2019-05-08 RX ADMIN — Medication 600 MILLIGRAM(S): at 17:15

## 2019-05-08 RX ADMIN — HYDROMORPHONE HYDROCHLORIDE 0.5 MILLIGRAM(S): 2 INJECTION INTRAMUSCULAR; INTRAVENOUS; SUBCUTANEOUS at 09:05

## 2019-05-08 RX ADMIN — HYDROMORPHONE HYDROCHLORIDE 0.5 MILLIGRAM(S): 2 INJECTION INTRAMUSCULAR; INTRAVENOUS; SUBCUTANEOUS at 03:37

## 2019-05-08 RX ADMIN — HYDROMORPHONE HYDROCHLORIDE 0.5 MILLIGRAM(S): 2 INJECTION INTRAMUSCULAR; INTRAVENOUS; SUBCUTANEOUS at 03:52

## 2019-05-08 RX ADMIN — HYDROMORPHONE HYDROCHLORIDE 0.5 MILLIGRAM(S): 2 INJECTION INTRAMUSCULAR; INTRAVENOUS; SUBCUTANEOUS at 08:51

## 2019-05-08 RX ADMIN — OXYCODONE AND ACETAMINOPHEN 1 TABLET(S): 5; 325 TABLET ORAL at 17:15

## 2019-05-08 RX ADMIN — Medication 100 MILLIGRAM(S): at 22:10

## 2019-05-08 RX ADMIN — Medication 325 MILLIGRAM(S): at 11:53

## 2019-05-08 RX ADMIN — OXYCODONE AND ACETAMINOPHEN 2 TABLET(S): 5; 325 TABLET ORAL at 19:41

## 2019-05-08 RX ADMIN — Medication 100 MILLIGRAM(S): at 16:50

## 2019-05-08 RX ADMIN — OXYCODONE AND ACETAMINOPHEN 1 TABLET(S): 5; 325 TABLET ORAL at 18:15

## 2019-05-08 RX ADMIN — PANTOPRAZOLE SODIUM 40 MILLIGRAM(S): 20 TABLET, DELAYED RELEASE ORAL at 05:18

## 2019-05-08 RX ADMIN — Medication 30 MILLIGRAM(S): at 05:18

## 2019-05-08 RX ADMIN — ONDANSETRON 4 MILLIGRAM(S): 8 TABLET, FILM COATED ORAL at 06:50

## 2019-05-08 RX ADMIN — ENOXAPARIN SODIUM 40 MILLIGRAM(S): 100 INJECTION SUBCUTANEOUS at 11:52

## 2019-05-08 RX ADMIN — Medication 30 MILLIGRAM(S): at 13:59

## 2019-05-08 RX ADMIN — Medication 100 MILLIGRAM(S): at 00:15

## 2019-05-08 RX ADMIN — Medication 250 MILLIGRAM(S): at 09:15

## 2019-05-08 RX ADMIN — OXYCODONE AND ACETAMINOPHEN 2 TABLET(S): 5; 325 TABLET ORAL at 20:25

## 2019-05-08 RX ADMIN — ATORVASTATIN CALCIUM 40 MILLIGRAM(S): 80 TABLET, FILM COATED ORAL at 22:10

## 2019-05-08 RX ADMIN — Medication 30 MILLIGRAM(S): at 22:10

## 2019-05-08 RX ADMIN — Medication 25 MILLIGRAM(S): at 22:42

## 2019-05-08 RX ADMIN — Medication 100 MILLIGRAM(S): at 08:15

## 2019-05-08 RX ADMIN — SENNA PLUS 2 TABLET(S): 8.6 TABLET ORAL at 22:10

## 2019-05-08 RX ADMIN — Medication 100 MILLIGRAM(S): at 13:59

## 2019-05-08 RX ADMIN — Medication 100 MILLIGRAM(S): at 05:18

## 2019-05-08 RX ADMIN — Medication 50 GRAM(S): at 03:54

## 2019-05-08 RX ADMIN — Medication 400 MILLIGRAM(S): at 08:51

## 2019-05-08 RX ADMIN — Medication 250 MILLIGRAM(S): at 20:00

## 2019-05-08 NOTE — PROGRESS NOTE ADULT - SUBJECTIVE AND OBJECTIVE BOX
Brief Hospital Course:   : Presented to [facility] ED with CP  : Cath showing MVD  :  CABG x 3 performed utilizing bilateral SORAIDA's and left radial artery.  Left SORAIDA anastomosis to LAD and left radial artery anastomosis to OM1 on CPB.  Right SORAIDA anastomosis to PDA performed off bypass.    Significant recent/past 24 hr events:  No acute overnight events.           Review of Systems         Unable to obtain due to: intubated & sedated altered mental status _______________    Constitutional: denies fever, chills, general malaise, fatigue, weight loss, weight gain, diaphoresis   HEENT: denies dry mouth, sore throat, runny nose, photophobia, blurry vision, double vision, discharge, eye pain, difficulty hearing, vertigo, dysphagia, epistaxis, recent dental work    Neck: denies pain or stiffness  Breasts: denies pain, masses, or nipple discharge  Respiratory: denies SOB, ESCOBAR, cough, phlegm, wheezing, hemoptysis  Cardiovascular: denies CP, palpitations, edema, diaphoresis   Gastrointestinal: denies nausea, vomiting or hematemesis, diarrhea, constipation, abdominal or epigastric pain, melena or hematochezia   Genitourinary: denies dysuria, frequency, urgency, incontinence, hematuria   Skin: denies rash, hives, itching, burning, masses  Musculoskeletal: denies myalgias, arthritis, joint swelling, muscle weakness  Neurologic: denies syncope, LOC, headache, weakness, dizziness, parasthesias, numbness, seizures, confusion, dementia   Psychiatric: denies feeling anxious, depressed, suicidal, homicidal  Endocrine: denies increased fingerstick glucoses, cold or heat intolerance, polydipsia, polyuria, polyphagia, hair loss  Hematology/Oncology: denies bruising, tender or enlarged lymph nodes   Allergy/immunologic: denies hives or eczema  ROS negative x 10 systems except as noted above      Patient is a 68y old  Male who presents with a chief complaint of chest pain (07 May 2019 13:42)    HPI:  69 yo male obese with HTN , alcoholism presented to the ED with complaints of left sided chest pain and shortness of breath . He states that has been feeling left chest discomfort since Thursday on off worsening sharp pain with no relieve completly , radiates to his back and left shoulder . associated with dyspnea on exertion heavy work , at present has pain 4/10 , he admits drinking 4-6 beers almost daily in the evening , last drink was last night . Evaluated in the Ed by cardiology m CTA of coronaries done showed CAD plaque , now being admitted for cardiac cath unstable angina . (2019 17:05)    PAST MEDICAL & SURGICAL HISTORY:  ETOH abuse  Esophageal varices  Hyperlipidemia  Hypertension  No significant past surgical history    FAMILY HISTORY:  FH: CAD (coronary artery disease): brother dx ed at age 62  FH: Alzheimers disease: father  at age 74         Vitals   ICU Vital Signs Last 24 Hrs  T(C): 37.7 (08 May 2019 04:00), Max: 37.7 (08 May 2019 04:00)  T(F): 99.9 (08 May 2019 04:00), Max: 99.9 (08 May 2019 04:00)  HR: 86 (08 May 2019 05:00) (55 - 86)  BP: 122/78 (07 May 2019 05:45) (122/78 - 122/78)  BP(mean): --  ABP: 141/70 (08 May 2019 05:00) (110/59 - 154/69)  ABP(mean): 91 (08 May 2019 05:00) (76 - 97)  RR: 18 (08 May 2019 05:00) (12 - 30)  SpO2: 94% (08 May 2019 05:00) (94% - 100%)      VENT SETTINGS   Mode: CPAP with PS  FiO2: 50  PEEP: 5  PS: 5  MAP: 9    ABG - ( 07 May 2019 23:19 )  pH, Arterial: 7.37  pH, Blood: x     /  pCO2: 42    /  pO2: 77    / HCO3: 24    / Base Excess: -0.9  /  SaO2: 96                  I&O's Detail    06 May 2019 07:  -  07 May 2019 07:00  --------------------------------------------------------  IN:    Oral Fluid: 740 mL  Total IN: 740 mL    OUT:    Indwelling Catheter - Urethral: 3000 mL  Total OUT: 3000 mL    Total NET: -2260 mL      07 May 2019 07:01  -  08 May 2019 05:34  --------------------------------------------------------  IN:    Albumin 5%  - 250 mL: 750 mL    diltiazem Infusion: 77.5 mL    insulin regular Infusion: 45 mL    Lactated Ringers IV Bolus: 250 mL    norepinephrine Infusion: 97 mL    norepinephrine Infusion: 4 mL    Oral Fluid: 200 mL    propofol Infusion: 22 mL    sodium chloride 0.9%.: 160 mL    sodium chloride 0.9%.: 80 mL    Solution: 100 mL    Solution: 50 mL    Solution: 250 mL    Solution: 100 mL  Total IN: 2185.5 mL    OUT:    Chest Tube: 170 mL    Chest Tube: 360 mL    Chest Tube: 670 mL    Indwelling Catheter - Urethral: 1865 mL  Total OUT: 3065 mL    Total NET: -879.5 mL          LABS                        9.1    14.2  )-----------( 170      ( 08 May 2019 02:30 )             28.4     05-08    138  |  104  |  18.0  ----------------------------<  136<H>  4.5   |  23.0  |  1.04    Ca    8.3<L>      08 May 2019 02:30  Phos  2.8     05-08  Mg     1.9     05-08    TPro  6.4<L>  /  Alb  3.4  /  TBili  1.1  /  DBili  x   /  AST  34  /  ALT  18  /  AlkPhos  65  05-07    LIVER FUNCTIONS - ( 07 May 2019 14:09 )  Alb: 3.4 g/dL / Pro: 6.4 g/dL / ALK PHOS: 65 U/L / ALT: 18 U/L / AST: 34 U/L / GGT: x           PT/INR - ( 08 May 2019 02:30 )   PT: 14.9 sec;   INR: 1.29 ratio         PTT - ( 08 May 2019 02:30 )  PTT:27.5 sec  CARDIAC MARKERS ( 08 May 2019 02:30 )  CKx     / CKMBx     / Troponin T0.28 ng/mL /  CARDIAC MARKERS ( 07 May 2019 14:09 )   U/L / CKMB15.2 ng/mL / Troponin T0.31 ng/mL /          POCT Blood Glucose.: 142 mg/dL (19 @ 03:10)  POCT Blood Glucose.: 128 mg/dL (19 @ 02:14)  POCT Blood Glucose.: 148 mg/dL (19 @ 00:46)  POCT Blood Glucose.: 143 mg/dL (19 @ 23:00)  POCT Blood Glucose.: 148 mg/dL (19 @ 22:16)  POCT Blood Glucose.: 197 mg/dL (19 @ 20:11)  POCT Blood Glucose.: 230 mg/dL (19 @ 19:05)  POCT Blood Glucose.: 239 mg/dL (19 @ 16:57)  POCT Blood Glucose.: 229 mg/dL (19 @ 16:04)        MEDICATIONS  (STANDING):  aspirin enteric coated 325 milliGRAM(s) Oral daily  atorvastatin 40 milliGRAM(s) Oral at bedtime  cefuroxime  IVPB 1500 milliGRAM(s) IV Intermittent every 8 hours  chlorhexidine 2% Cloths 1 Application(s) Topical daily  dextrose 50% Injectable 50 milliLiter(s) IV Push every 15 minutes  dextrose 50% Injectable 25 milliLiter(s) IV Push every 15 minutes  diltiazem    Tablet 30 milliGRAM(s) Oral three times a day  diltiazem Infusion 2.5 mG/Hr (2.5 mL/Hr) IV Continuous <Continuous>  docusate sodium 100 milliGRAM(s) Oral three times a day  insulin regular Infusion 2 Unit(s)/Hr (2 mL/Hr) IV Continuous <Continuous>  norepinephrine Infusion 0.05 MICROgram(s)/kG/Min (9.928 mL/Hr) IV Continuous <Continuous>  pantoprazole    Tablet 40 milliGRAM(s) Oral before breakfast  potassium chloride  10 mEq/50 mL IVPB 10 milliEquivalent(s) IV Intermittent every 1 hour  potassium chloride  10 mEq/50 mL IVPB 10 milliEquivalent(s) IV Intermittent every 1 hour  potassium chloride  10 mEq/50 mL IVPB 10 milliEquivalent(s) IV Intermittent every 1 hour  sodium chloride 0.9%. 1000 milliLiter(s) (10 mL/Hr) IV Continuous <Continuous>  sodium chloride 0.9%. 1000 milliLiter(s) (5 mL/Hr) IV Continuous <Continuous>  vancomycin  IVPB 1000 milliGRAM(s) IV Intermittent every 12 hours    MEDICATIONS  (PRN):      Allergies:  No Known Allergies  [facility] diet (Unknown)        Physical Exam:   Constitutional: NAD, well-groomed, well-developed  HEENT: PERRLA, EOMI, no drainage or redness  Neck: supple,  No JVD  Back: Normal spine flexure, No CVA tenderness, No deformity or limitation of movement  Respiratory: Breath Sounds equal & clear bilaterally to auscultation, no accessory muscle use noted  Cardiovascular: Regular rate, regular rhythm, normal S1, S2; no murmurs or rub  Gastrointestinal: Soft, non-tender, non distended, no hepatosplenomegaly, normal bowel sounds  Extremities: PARIKH x 4, no peripheral edema, no cyanosis, no clubbing   Vascular: Equal and normal pulses: 2+ peripheral pulses throughout  Neurological: A+O x 3; speech clear and intact; no sensory, motor  deficits, normal reflexes  Psychiatric: calm, normal mood, normal affect  Musculoskeletal: No joint swelling or deformity; no limitation of movement  Skin: warm, dry, well perfused, no rashes      Code Status:       Critical care time spent: ____minutes (reviewing chart including medication, labs and imaging results, discussions with interdisciplinary team, discussing goals of care/advanced directives, counseling patient and/or family, non-inclusive of procedures)    Case including assessment/plan of care discussed with                    [facility] attending. Brief Hospital Course:   : Presented to [facility] ED with CP  : Cath showing MVD  :  CABG x 3 performed utilizing bilateral SORAIDA's and left radial artery.  Left SORAIDA anastomosis to LAD and left radial artery anastomosis to OM1 on CPB.  Right SORAIDA anastomosis to PDA performed off bypass.    Significant recent/past 24 hr events:  No acute overnight events. Levo gtt weaned off. Pt in NAD, states he is feeling "good" and denies N/V/D, HA, blurry vision, dizziness, numbness tingling, SOB, chest pain, palpitations, abd pain or urinary sx's.     Review of Systems       Constitutional: denies fever, chills, general malaise, fatigue  HEENT: denies blurry vision, double vision, vertigo, dysphagia   Neck: denies pain or stiffness  Respiratory: denies SOB, ESCOBAR, cough, phlegm, wheezing, hemoptysis  Cardiovascular: denies CP, palpitations, edema, diaphoresis   Gastrointestinal: denies nausea, vomiting or hematemesis, diarrhea, constipation, abdominal or epigastric pain, melena or hematochezia   Genitourinary: denies dysuria, frequency, urgency, hematuria   Skin: denies rash, hives, itching, burning, masses  Musculoskeletal: denies myalgias, arthritis, joint swelling, muscle weakness  Neurologic: denies syncope, LOC, headache, weakness, dizziness, parasthesias, numbness  Endocrine: denies cold or heat intolerance    Patient is a 68y old  Male who presents with a chief complaint of chest pain (07 May 2019 13:42)    HPI:  67 yo male obese with HTN , alcoholism presented to the ED with complaints of left sided chest pain and shortness of breath . He states that has been feeling left chest discomfort since Thursday on off worsening sharp pain with no relieve completly , radiates to his back and left shoulder . associated with dyspnea on exertion heavy work , at present has pain 4/10 , he admits drinking 4-6 beers almost daily in the evening , last drink was last night . Evaluated in the Ed by cardiology m CTA of coronaries done showed CAD plaque , now being admitted for cardiac cath unstable angina . (2019 17:05)    PAST MEDICAL & SURGICAL HISTORY:  ETOH abuse  Esophageal varices  Hyperlipidemia  Hypertension  No significant past surgical history    FAMILY HISTORY:  FH: CAD (coronary artery disease): brother dx ed at age 62  FH: Alzheimers disease: father  at age 74     Vitals   ICU Vital Signs Last 24 Hrs  T(C): 37.7 (08 May 2019 04:00), Max: 37.7 (08 May 2019 04:00)  T(F): 99.9 (08 May 2019 04:00), Max: 99.9 (08 May 2019 04:00)  HR: 86 (08 May 2019 05:00) (55 - 86)  BP: 122/78 (07 May 2019 05:45) (122/78 - 122/78)  BP(mean): --  ABP: 141/70 (08 May 2019 05:00) (110/59 - 154/69)  ABP(mean): 91 (08 May 2019 05:00) (76 - 97)  RR: 18 (08 May 2019 05:00) (12 - 30)  SpO2: 94% (08 May 2019 05:00) (94% - 100%)    ABG - ( 07 May 2019 23:19 )  pH, Arterial: 7.37  pH, Blood: x     /  pCO2: 42    /  pO2: 77    / HCO3: 24    / Base Excess: -0.9  /  SaO2: 96          I&O's Detail    06 May 2019 07:01  -  07 May 2019 07:00  --------------------------------------------------------  IN:    Oral Fluid: 740 mL  Total IN: 740 mL    OUT:    Indwelling Catheter - Urethral: 3000 mL  Total OUT: 3000 mL    Total NET: -2260 mL      07 May 2019 07:01  -  08 May 2019 05:34  --------------------------------------------------------  IN:    Albumin 5%  - 250 mL: 750 mL    diltiazem Infusion: 77.5 mL    insulin regular Infusion: 45 mL    Lactated Ringers IV Bolus: 250 mL    norepinephrine Infusion: 97 mL    norepinephrine Infusion: 4 mL    Oral Fluid: 200 mL    propofol Infusion: 22 mL    sodium chloride 0.9%.: 160 mL    sodium chloride 0.9%.: 80 mL    Solution: 100 mL    Solution: 50 mL    Solution: 250 mL    Solution: 100 mL  Total IN: 2185.5 mL    OUT:    Chest Tube: 170 mL    Chest Tube: 360 mL    Chest Tube: 670 mL    Indwelling Catheter - Urethral: 1865 mL  Total OUT: 3065 mL    Total NET: -879.5 mL    LABS                        9.1    14.2  )-----------( 170      ( 08 May 2019 02:30 )             28.4     05-08    138  |  104  |  18.0  ----------------------------<  136<H>  4.5   |  23.0  |  1.04    Ca    8.3<L>      08 May 2019 02:30  Phos  2.8       Mg     1.9         TPro  6.4<L>  /  Alb  3.4  /  TBili  1.1  /  DBili  x   /  AST  34  /  ALT  18  /  AlkPhos  65  05-07    LIVER FUNCTIONS - ( 07 May 2019 14:09 )  Alb: 3.4 g/dL / Pro: 6.4 g/dL / ALK PHOS: 65 U/L / ALT: 18 U/L / AST: 34 U/L / GGT: x           PT/INR - ( 08 May 2019 02:30 )   PT: 14.9 sec;   INR: 1.29 ratio         PTT - ( 08 May 2019 02:30 )  PTT:27.5 sec  CARDIAC MARKERS ( 08 May 2019 02:30 )  CKx     / CKMBx     / Troponin T0.28 ng/mL /  CARDIAC MARKERS ( 07 May 2019 14:09 )   U/L / CKMB15.2 ng/mL / Troponin T0.31 ng/mL /      POCT Blood Glucose.: 142 mg/dL (19 @ 03:10)  POCT Blood Glucose.: 128 mg/dL (19 @ 02:14)  POCT Blood Glucose.: 148 mg/dL (19 @ 00:46)  POCT Blood Glucose.: 143 mg/dL (19 @ 23:00)  POCT Blood Glucose.: 148 mg/dL (19 @ 22:16)  POCT Blood Glucose.: 197 mg/dL (19 @ 20:11)  POCT Blood Glucose.: 230 mg/dL (19 @ 19:05)  POCT Blood Glucose.: 239 mg/dL (19 @ 16:57)  POCT Blood Glucose.: 229 mg/dL (19 @ 16:04)      MEDICATIONS  (STANDING):  aspirin enteric coated 325 milliGRAM(s) Oral daily  atorvastatin 40 milliGRAM(s) Oral at bedtime  cefuroxime  IVPB 1500 milliGRAM(s) IV Intermittent every 8 hours  chlorhexidine 2% Cloths 1 Application(s) Topical daily  dextrose 50% Injectable 50 milliLiter(s) IV Push every 15 minutes  dextrose 50% Injectable 25 milliLiter(s) IV Push every 15 minutes  diltiazem    Tablet 30 milliGRAM(s) Oral three times a day  diltiazem Infusion 2.5 mG/Hr (2.5 mL/Hr) IV Continuous <Continuous>  docusate sodium 100 milliGRAM(s) Oral three times a day  insulin regular Infusion 2 Unit(s)/Hr (2 mL/Hr) IV Continuous <Continuous>  norepinephrine Infusion 0.05 MICROgram(s)/kG/Min (9.928 mL/Hr) IV Continuous <Continuous>  pantoprazole    Tablet 40 milliGRAM(s) Oral before breakfast  potassium chloride  10 mEq/50 mL IVPB 10 milliEquivalent(s) IV Intermittent every 1 hour  potassium chloride  10 mEq/50 mL IVPB 10 milliEquivalent(s) IV Intermittent every 1 hour  potassium chloride  10 mEq/50 mL IVPB 10 milliEquivalent(s) IV Intermittent every 1 hour  sodium chloride 0.9%. 1000 milliLiter(s) (10 mL/Hr) IV Continuous <Continuous>  sodium chloride 0.9%. 1000 milliLiter(s) (5 mL/Hr) IV Continuous <Continuous>  vancomycin  IVPB 1000 milliGRAM(s) IV Intermittent every 12 hours    MEDICATIONS  (PRN):    Allergies:  No Known Allergies  [facility] diet (Unknown)      Physical Exam:   Constitutional: NAD, well-groomed, well-developed  Neurological: A+O x 3; speech clear and intact; no sensory, motor deficits  HEENT: PERRLA, EOMI, no drainage or redness  Neck: supple, No JVD  Respiratory: Breath Sounds equal & clear bilaterally to auscultation, no accessory muscle use noted  Cardiovascular: Regular rate, regular rhythm, normal S1, S2; no murmurs or rub  Gastrointestinal: Soft, non-tender, non distended, normal bowel sounds  Extremities: PARIKH x 4, no peripheral edema, no cyanosis, no clubbing   Vascular: Equal and normal pulses: 2+ peripheral pulses throughout  Musculoskeletal: No joint swelling or deformity; no limitation of movement  Skin: warm, dry, well perfused, no rashes  Lines: Rt radial [place], RIJ central line      Critical care time spent:  35 minutes (reviewing chart including medication, labs and imaging results, discussions with interdisciplinary team, discussing goals of care/advanced directives, counseling patient and/or family, non-inclusive of procedures)    Case including assessment/plan of care discussed with CTICU team and  attending.

## 2019-05-08 NOTE — DIETITIAN INITIAL EVALUATION ADULT. - PERTINENT LABORATORY DATA
05-08 Na138 mmol/L Glu 136 mg/dL<H> K+ 4.5 mmol/L Cr  1.04 mg/dL BUN 18.0 mg/dL Phos 2.8 mg/dL Alb n/a   PAB n/a

## 2019-05-08 NOTE — DIETITIAN INITIAL EVALUATION ADULT. - OTHER INFO
67 yo male obese with HTN, alcoholism presents with complaints of left sided chest pain and SOB. Pt is s/p CABG. Spoke with pt and family at bedside. Pt reports good appetite/po intake currently and PTA. Family confirms pt eating well. Pt states he follows a regular diet at home. Reports weight has been stable. Denies chewing/swallowing difficulty. Provided with heart healthy diet education.

## 2019-05-08 NOTE — PHYSICAL THERAPY INITIAL EVALUATION ADULT - GENERAL OBSERVATIONS, REHAB EVAL
Pt. OOB; +monitor, O2 nasal canula, Right IJ central, radial bobby, chest tubes, gallego, temp. pacer box

## 2019-05-08 NOTE — PROGRESS NOTE ADULT - SUBJECTIVE AND OBJECTIVE BOX
Chief Complaint: chart and course reviewed.    HPI: S/P TVCABG using LIMA to LAD/ROMY to RCA and left radial to circ. Pt awake with some incisional pain.    PAST MEDICAL & SURGICAL HISTORY:  ETOH abuse  Esophageal varices  Hyperlipidemia  Hypertension  No significant past surgical history      PREVIOUS DIAGNOSTIC TESTING:      ECHO  FINDINGS:    STRESS  FINDINGS:    CATHETERIZATION  FINDINGS: TVCAD with preserved EF    MEDICATIONS  (STANDING):  aspirin enteric coated 325 milliGRAM(s) Oral daily  atorvastatin 40 milliGRAM(s) Oral at bedtime  cefuroxime  IVPB 1500 milliGRAM(s) IV Intermittent every 8 hours  chlorhexidine 2% Cloths 1 Application(s) Topical daily  dextrose 50% Injectable 50 milliLiter(s) IV Push every 15 minutes  dextrose 50% Injectable 25 milliLiter(s) IV Push every 15 minutes  diltiazem    Tablet 30 milliGRAM(s) Oral three times a day  diltiazem Infusion 2.5 mG/Hr (2.5 mL/Hr) IV Continuous <Continuous>  docusate sodium 100 milliGRAM(s) Oral three times a day  enoxaparin Injectable 40 milliGRAM(s) SubCutaneous daily  gemfibrozil 600 milliGRAM(s) Oral two times a day  insulin regular Infusion 2 Unit(s)/Hr (2 mL/Hr) IV Continuous <Continuous>  pantoprazole    Tablet 40 milliGRAM(s) Oral before breakfast  potassium chloride  10 mEq/50 mL IVPB 10 milliEquivalent(s) IV Intermittent every 1 hour  potassium chloride  10 mEq/50 mL IVPB 10 milliEquivalent(s) IV Intermittent every 1 hour  potassium chloride  10 mEq/50 mL IVPB 10 milliEquivalent(s) IV Intermittent every 1 hour  senna 2 Tablet(s) Oral at bedtime  sodium chloride 0.9%. 1000 milliLiter(s) (10 mL/Hr) IV Continuous <Continuous>  sodium chloride 0.9%. 1000 milliLiter(s) (5 mL/Hr) IV Continuous <Continuous>  vancomycin  IVPB 1000 milliGRAM(s) IV Intermittent every 12 hours    MEDICATIONS  (PRN):  polyethylene glycol 3350 17 Gram(s) Oral daily PRN Constipation      FAMILY HISTORY:  FH: CAD (coronary artery disease): brother dx ed at age 62  FH: Alzheimers disease: father  at age 74         ROS: Negative other than as mentioned in HPI.    Vital Signs Last 24 Hrs  T(C): 37.1 (08 May 2019 08:00), Max: 37.7 (08 May 2019 04:00)  T(F): 98.7 (08 May 2019 08:00), Max: 99.9 (08 May 2019 04:00)  HR: 80 reg(08 May 2019 09:00) (55 - 92)  BP: 115/70 A-line  BP(mean): --  RR: 14 ora (08 May 2019 09:00) (11 - 30)  SpO2: 94% (08 May 2019 09:00) (93% - 100%)    PHYSICAL EXAM:  General: Appears well developed, well nourished alert and cooperative. Alert afebrile nad  HEENT: Head; normocephalic, atraumatic.  Eyes;   Pupils reactive, cornea wnl.  Neck; Supple, no nodes adenopathy, no NVD or carotid bruit or thyromegaly.  CARDIOVASCULAR; No murmur, rub, gallop or lift. Normal S1 and S2.  LUNGS; No rales, rhonchi or wheeze. Normal breath sounds bilaterally.  ABDOMEN ; Soft, nontender without mass or organomegaly. bowel sounds normoactive.  EXTREMITIES; No clubbing, cyanosis or edema. Distal pulses wnl. ROM normal. Left arm bandaged.  SKIN; warm and dry with normal turgor.  NEURO; Alert/oriented x 3/normal motor exam.   PSYCH; normal affect.            INTERPRETATION OF TELEMETRY:    ECG: SR unchanged    I&O's Detail    07 May 2019 07:01  -  08 May 2019 07:00  --------------------------------------------------------  IN:    Albumin 5%  - 250 mL: 750 mL    diltiazem Infusion: 2.5 mL    diltiazem Infusion: 77.5 mL    insulin regular Infusion: 51 mL    Lactated Ringers IV Bolus: 250 mL    norepinephrine Infusion: 97 mL    norepinephrine Infusion: 4 mL    Oral Fluid: 200 mL    propofol Infusion: 22 mL    sodium chloride 0.9%.: 180 mL    sodium chloride 0.9%.: 90 mL    Solution: 100 mL    Solution: 100 mL    Solution: 250 mL    Solution: 50 mL  Total IN: 2224 mL    OUT:    Chest Tube: 680 mL    Chest Tube: 400 mL    Chest Tube: 180 mL    Indwelling Catheter - Urethral: 1935 mL  Total OUT: 3195 mL    Total NET: -971 mL      08 May 2019 07:01  -  08 May 2019 09:41  --------------------------------------------------------  IN:    diltiazem Infusion: 10 mL    insulin regular Infusion: 4 mL    sodium chloride 0.9%.: 10 mL    sodium chloride 0.9%.: 20 mL    Solution: 250 mL    Solution: 50 mL  Total IN: 344 mL    OUT:    Indwelling Catheter - Urethral: 120 mL  Total OUT: 120 mL    Total NET: 224 mL          LABS:                        9.1    14.2  )-----------( 170      ( 08 May 2019 02:30 )             28.4     05-08    138  |  104  |  18.0  ----------------------------<  136<H>  4.5   |  23.0  |  1.04    Ca    8.3<L>      08 May 2019 02:30  Phos  2.8     05-08  Mg     1.9     05-08    TPro  6.4<L>  /  Alb  3.4  /  TBili  1.1  /  DBili  x   /  AST  34  /  ALT  18  /  AlkPhos  65  05-07    CARDIAC MARKERS ( 08 May 2019 02:30 )  x     / 0.28 ng/mL / x     / x     / x      CARDIAC MARKERS ( 07 May 2019 14:09 )  x     / 0.31 ng/mL / 161 U/L / x     / 15.2 ng/mL      PT/INR - ( 08 May 2019 02:30 )   PT: 14.9 sec;   INR: 1.29 ratio         PTT - ( 08 May 2019 02:30 )  PTT:27.5 sec    I&O's Summary    07 May 2019 07:01  -  08 May 2019 07:00  --------------------------------------------------------  IN: 2224 mL / OUT: 3195 mL / NET: -971 mL    08 May 2019 07:01  -  08 May 2019 09:41  --------------------------------------------------------  IN: 344 mL / OUT: 120 mL / NET: 224 mL        RADIOLOGY & ADDITIONAL STUDIES:

## 2019-05-08 NOTE — PROGRESS NOTE ADULT - ASSESSMENT
69 yo male obese with HTN , alcoholism presented to the ED with complaints of left sided chest pain and shortness of breath . He states that has been feeling left chest discomfort since Thursday on/off worsening sharp pain with no complete relieve, Radiates to his back and left shoulder with associated dyspnea on exertion with heavy work , chest pain 4/10 , admits drinking 4-6 beers almost daily in the evening , last drink was last night . Evaluated in the ED by cardiology  CTA of coronaries done showed CAD plaque , now being admitted for cardiac cath unstable angina . POD#1: CABG x 3 performed utilizing bilateral SORAIDA's and left radial artery.  Left SORAIDA anastomosis to LAD and left radial artery anastomosis to OM1 on CPB.  Right SORAIDA anastomosis to PDA performed off bypass.

## 2019-05-09 LAB
ANION GAP SERPL CALC-SCNC: 11 MMOL/L — SIGNIFICANT CHANGE UP (ref 5–17)
BUN SERPL-MCNC: 22 MG/DL — HIGH (ref 8–20)
CALCIUM SERPL-MCNC: 8.1 MG/DL — LOW (ref 8.6–10.2)
CHLORIDE SERPL-SCNC: 100 MMOL/L — SIGNIFICANT CHANGE UP (ref 98–107)
CO2 SERPL-SCNC: 25 MMOL/L — SIGNIFICANT CHANGE UP (ref 22–29)
CREAT SERPL-MCNC: 0.92 MG/DL — SIGNIFICANT CHANGE UP (ref 0.5–1.3)
GLUCOSE BLDC GLUCOMTR-MCNC: 113 MG/DL — HIGH (ref 70–99)
GLUCOSE BLDC GLUCOMTR-MCNC: 124 MG/DL — HIGH (ref 70–99)
GLUCOSE BLDC GLUCOMTR-MCNC: 128 MG/DL — HIGH (ref 70–99)
GLUCOSE SERPL-MCNC: 132 MG/DL — HIGH (ref 70–115)
HCT VFR BLD CALC: 23.1 % — LOW (ref 42–52)
HCT VFR BLD CALC: 23.2 % — LOW (ref 42–52)
HCT VFR BLD CALC: 26.5 % — LOW (ref 42–52)
HGB BLD-MCNC: 7.2 G/DL — LOW (ref 14–18)
HGB BLD-MCNC: 7.3 G/DL — LOW (ref 14–18)
HGB BLD-MCNC: 8.5 G/DL — LOW (ref 14–18)
MAGNESIUM SERPL-MCNC: 2.1 MG/DL — SIGNIFICANT CHANGE UP (ref 1.8–2.6)
MCHC RBC-ENTMCNC: 22.8 PG — LOW (ref 27–31)
MCHC RBC-ENTMCNC: 23.1 PG — LOW (ref 27–31)
MCHC RBC-ENTMCNC: 23.8 PG — LOW (ref 27–31)
MCHC RBC-ENTMCNC: 31.2 G/DL — LOW (ref 32–36)
MCHC RBC-ENTMCNC: 31.5 G/DL — LOW (ref 32–36)
MCHC RBC-ENTMCNC: 32.1 G/DL — SIGNIFICANT CHANGE UP (ref 32–36)
MCV RBC AUTO: 73.1 FL — LOW (ref 80–94)
MCV RBC AUTO: 73.4 FL — LOW (ref 80–94)
MCV RBC AUTO: 74.2 FL — LOW (ref 80–94)
PLATELET # BLD AUTO: 156 K/UL — SIGNIFICANT CHANGE UP (ref 150–400)
PLATELET # BLD AUTO: 159 K/UL — SIGNIFICANT CHANGE UP (ref 150–400)
PLATELET # BLD AUTO: 169 K/UL — SIGNIFICANT CHANGE UP (ref 150–400)
POTASSIUM SERPL-MCNC: 4.7 MMOL/L — SIGNIFICANT CHANGE UP (ref 3.5–5.3)
POTASSIUM SERPL-SCNC: 4.7 MMOL/L — SIGNIFICANT CHANGE UP (ref 3.5–5.3)
RBC # BLD: 3.16 M/UL — LOW (ref 4.6–6.2)
RBC # BLD: 3.16 M/UL — LOW (ref 4.6–6.2)
RBC # BLD: 3.57 M/UL — LOW (ref 4.6–6.2)
RBC # FLD: 15.5 % — SIGNIFICANT CHANGE UP (ref 11–15.6)
RBC # FLD: 15.6 % — SIGNIFICANT CHANGE UP (ref 11–15.6)
RBC # FLD: 15.9 % — HIGH (ref 11–15.6)
SODIUM SERPL-SCNC: 136 MMOL/L — SIGNIFICANT CHANGE UP (ref 135–145)
WBC # BLD: 12.4 K/UL — HIGH (ref 4.8–10.8)
WBC # BLD: 12.5 K/UL — HIGH (ref 4.8–10.8)
WBC # BLD: 13.7 K/UL — HIGH (ref 4.8–10.8)
WBC # FLD AUTO: 12.4 K/UL — HIGH (ref 4.8–10.8)
WBC # FLD AUTO: 12.5 K/UL — HIGH (ref 4.8–10.8)
WBC # FLD AUTO: 13.7 K/UL — HIGH (ref 4.8–10.8)

## 2019-05-09 PROCEDURE — 99024 POSTOP FOLLOW-UP VISIT: CPT

## 2019-05-09 PROCEDURE — 71045 X-RAY EXAM CHEST 1 VIEW: CPT | Mod: 26,76

## 2019-05-09 RX ORDER — FUROSEMIDE 40 MG
20 TABLET ORAL ONCE
Refills: 0 | Status: COMPLETED | OUTPATIENT
Start: 2019-05-09 | End: 2019-05-09

## 2019-05-09 RX ORDER — FUROSEMIDE 40 MG
40 TABLET ORAL EVERY 12 HOURS
Refills: 0 | Status: DISCONTINUED | OUTPATIENT
Start: 2019-05-09 | End: 2019-05-11

## 2019-05-09 RX ORDER — SODIUM CHLORIDE 9 MG/ML
3 INJECTION INTRAMUSCULAR; INTRAVENOUS; SUBCUTANEOUS EVERY 8 HOURS
Refills: 0 | Status: DISCONTINUED | OUTPATIENT
Start: 2019-05-09 | End: 2019-05-12

## 2019-05-09 RX ADMIN — POLYETHYLENE GLYCOL 3350 17 GRAM(S): 17 POWDER, FOR SOLUTION ORAL at 12:41

## 2019-05-09 RX ADMIN — Medication 25 MILLIGRAM(S): at 06:15

## 2019-05-09 RX ADMIN — SODIUM CHLORIDE 3 MILLILITER(S): 9 INJECTION INTRAMUSCULAR; INTRAVENOUS; SUBCUTANEOUS at 21:33

## 2019-05-09 RX ADMIN — Medication 100 MILLIGRAM(S): at 13:43

## 2019-05-09 RX ADMIN — Medication 40 MILLIGRAM(S): at 18:39

## 2019-05-09 RX ADMIN — Medication 25 MILLIGRAM(S): at 17:16

## 2019-05-09 RX ADMIN — OXYCODONE AND ACETAMINOPHEN 2 TABLET(S): 5; 325 TABLET ORAL at 06:15

## 2019-05-09 RX ADMIN — Medication 100 MILLIGRAM(S): at 21:32

## 2019-05-09 RX ADMIN — Medication 325 MILLIGRAM(S): at 12:40

## 2019-05-09 RX ADMIN — Medication 2 UNIT(S): at 08:21

## 2019-05-09 RX ADMIN — ATORVASTATIN CALCIUM 40 MILLIGRAM(S): 80 TABLET, FILM COATED ORAL at 21:32

## 2019-05-09 RX ADMIN — Medication 20 MILLIGRAM(S): at 13:43

## 2019-05-09 RX ADMIN — OXYCODONE AND ACETAMINOPHEN 2 TABLET(S): 5; 325 TABLET ORAL at 07:00

## 2019-05-09 RX ADMIN — Medication 600 MILLIGRAM(S): at 17:16

## 2019-05-09 RX ADMIN — Medication 100 MILLIGRAM(S): at 06:15

## 2019-05-09 RX ADMIN — ENOXAPARIN SODIUM 40 MILLIGRAM(S): 100 INJECTION SUBCUTANEOUS at 12:39

## 2019-05-09 RX ADMIN — Medication 250 MILLIGRAM(S): at 09:50

## 2019-05-09 RX ADMIN — SENNA PLUS 2 TABLET(S): 8.6 TABLET ORAL at 21:32

## 2019-05-09 RX ADMIN — Medication 600 MILLIGRAM(S): at 07:27

## 2019-05-09 RX ADMIN — Medication 100 MILLIGRAM(S): at 00:30

## 2019-05-09 RX ADMIN — CHLORHEXIDINE GLUCONATE 1 APPLICATION(S): 213 SOLUTION TOPICAL at 12:40

## 2019-05-09 RX ADMIN — Medication 2 UNIT(S): at 17:26

## 2019-05-09 RX ADMIN — Medication 30 MILLIGRAM(S): at 06:15

## 2019-05-09 RX ADMIN — Medication 2 UNIT(S): at 12:38

## 2019-05-09 RX ADMIN — Medication 30 MILLIGRAM(S): at 21:32

## 2019-05-09 RX ADMIN — Medication 30 MILLIGRAM(S): at 13:43

## 2019-05-09 RX ADMIN — Medication 20 MILLIGRAM(S): at 07:26

## 2019-05-09 RX ADMIN — PANTOPRAZOLE SODIUM 40 MILLIGRAM(S): 20 TABLET, DELAYED RELEASE ORAL at 07:26

## 2019-05-09 NOTE — PROGRESS NOTE ADULT - SUBJECTIVE AND OBJECTIVE BOX
[facility]                              [address] [address]                                                 PHONE: [phone]                                                 FAX: [phone]  -----------------------------------------------------------------------------------------------  Pt seen and examined. FU for CAD    Overnight events/Complaints: Pt with mild chest pain. Ambulated yesterday.    Vital Signs Last 24 Hrs  T(C): 37.5 (09 May 2019 01:22), Max: 37.7 (08 May 2019 16:05)  T(F): 99.5 (09 May 2019 01:22), Max: 99.9 (08 May 2019 16:05)  HR: 68 (09 May 2019 07:00) (68 - 101)  BP: 103/65 (09 May 2019 07:00) (103/65 - 144/87)  BP(mean): 77 (09 May 2019 07:00) (77 - 110)  RR: 29 (09 May 2019 07:00) (11 - 29)  SpO2: 97% (09 May 2019 07:00) (93% - 99%)  I&O's Summary    08 May 2019 07:01  -  09 May 2019 07:00  --------------------------------------------------------  IN: 2384.5 mL / OUT: 1480 mL / NET: 904.5 mL        PHYSICAL EXAM:  Constitutional: Appears well developed, well nourished; alert and co-operative  HEENT:     Head: Normocephalic and atraumatic  Neck: supple. No JVD  Cardiovascular: Normal S1 S2, No murmurs  Respiratory: L side chest tube  Gastrointestinal:  Soft, Non-tender, + BS	  Musculoskeletal: Normal range of motion. No edema  Skin: Warm and dry. No cyanosis . No diaphoresis.  Neurologic: Alert oriented to time place and person. Normal strength and no tremor.        LABS:                        7.2    13.7  )-----------( 156      ( 09 May 2019 04:27 )             23.1     05-09    136  |  100  |  22.0<H>  ----------------------------<  132<H>  4.7   |  25.0  |  0.92    Ca    8.1<L>      09 May 2019 04:27  Phos  2.8     05-08  Mg     2.1     05-09    TPro  6.4<L>  /  Alb  3.4  /  TBili  1.1  /  DBili  x   /  AST  34  /  ALT  18  /  AlkPhos  65  05-07    CARDIAC MARKERS ( 08 May 2019 02:30 )  x     / 0.28 ng/mL / x     / x     / x      CARDIAC MARKERS ( 07 May 2019 14:09 )  x     / 0.31 ng/mL / 161 U/L / x     / 15.2 ng/mL      PT/INR - ( 08 May 2019 02:30 )   PT: 14.9 sec;   INR: 1.29 ratio         PTT - ( 08 May 2019 02:30 )  PTT:27.5 sec    RADIOLOGY & ADDITIONAL STUDIES: (reviewed)    CARDIOLOGY TESTING:(reviewed)     TELEMETRY (Independent visualization) : No arrhythmias    ECHOCARDIOGRAM:  < from: TTE Echo Complete w/Doppler (05.02.19 @ 11:49) >  Summary:   1. Left ventricular ejection fraction, by visual estimation, is 50 to   55%.   2. Normal global left ventricular systolic function.   3. Apical anterior segment, apical inferior segment, and apex are   abnormal as described above.   4. Spectral Doppler shows impaired relaxation pattern of left   ventricular myocardial filling (Grade I diastolic dysfunction).   5. There is mild concentric left ventricular hypertrophy.   6. Endocardial visualization was enhanced with intravenous echo contrast.    MD Mat Electronically signed on 5/2/2019 at 3:33:16 PM    < end of copied text >    CARDIAC CATHETERIZATION:  < from: Cardiac Cath Lab - Adult (05.01.19 @ 17:22) >  CORONARY VESSELS: The coronary circulation is right dominant.  LAD:   --  Proximal LAD: There was a tubular 90 % stenosis. The lesion was  irregularly contoured and calcified.  CX:   --  Mid circumflex: There was a [id number] % stenosis. The lesion was  irregularly contoured and ulcerated.  RCA:   --  Distal RCA: There was a 70 % stenosis.  COMPLICATIONS: No complications occurred during the cath lab visit.  DIAGNOSTIC RECOMMENDATIONS: Patient with abnormal CTA and UA  Cath reveals significant stenosis of the pLAD, mLCx and dRCA.  Plan: CABG eval with Dr Soto  Discussed with Dr Delvin Jacob ([facility])  Prepared and signed by  Patricio Simpson MD  Signed 05/01/2019 18:03:00    < end of copied text >      MEDICATIONS:(reviewed)  MEDICATIONS  (STANDING):  aspirin enteric coated 325 milliGRAM(s) Oral daily  atorvastatin 40 milliGRAM(s) Oral at bedtime  chlorhexidine 2% Cloths 1 Application(s) Topical daily  dextrose 5%. 1000 milliLiter(s) (50 mL/Hr) IV Continuous <Continuous>  dextrose 50% Injectable 12.5 Gram(s) IV Push once  dextrose 50% Injectable 25 Gram(s) IV Push once  dextrose 50% Injectable 25 Gram(s) IV Push once  dextrose 50% Injectable 50 milliLiter(s) IV Push every 15 minutes  dextrose 50% Injectable 25 milliLiter(s) IV Push every 15 minutes  diltiazem    Tablet 30 milliGRAM(s) Oral three times a day  docusate sodium 100 milliGRAM(s) Oral three times a day  enoxaparin Injectable 40 milliGRAM(s) SubCutaneous daily  furosemide    Tablet 40 milliGRAM(s) Oral every 12 hours  gemfibrozil 600 milliGRAM(s) Oral two times a day  insulin lispro (HumaLOG) corrective regimen sliding scale   SubCutaneous three times a day before meals  insulin lispro Injectable (HumaLOG) 2 Unit(s) SubCutaneous three times a day before meals  metoprolol tartrate 25 milliGRAM(s) Oral two times a day  pantoprazole    Tablet 40 milliGRAM(s) Oral before breakfast  senna 2 Tablet(s) Oral at bedtime  sodium chloride 0.9%. 1000 milliLiter(s) (10 mL/Hr) IV Continuous <Continuous>  sodium chloride 0.9%. 1000 milliLiter(s) (5 mL/Hr) IV Continuous <Continuous>  vancomycin  IVPB 1000 milliGRAM(s) IV Intermittent every 12 hours    MEDICATIONS  (PRN):  dextrose 40% Gel 15 Gram(s) Oral once PRN Blood Glucose LESS THAN 70 milliGRAM(s)/deciliter  glucagon  Injectable 1 milliGRAM(s) IntraMuscular once PRN Glucose LESS THAN 70 milligrams/deciliter  oxyCODONE    5 mG/acetaminophen 325 mG 1 Tablet(s) Oral every 4 hours PRN Moderate Pain (4 - 6)  oxyCODONE    5 mG/acetaminophen 325 mG 2 Tablet(s) Oral every 4 hours PRN Severe Pain (7 - 10)  polyethylene glycol 3350 17 Gram(s) Oral daily PRN Constipation      ASSESSMENT AND PLAN:    67 yo male obese with HTN , alcoholism presented to the ED with complaints of left sided chest pain and shortness of breath. Cath with TVD  S/P TVCABG using LIMA to LAD/ROMY to RCA and left radial to circ    1. CAD: Continue ASA, metoprolol and statin  2. Monitor H/H  3. Mx as per CTS

## 2019-05-09 NOTE — PROGRESS NOTE ADULT - ASSESSMENT
69 yo male obese with HTN , alcoholism presented to the ED with complaints of left sided chest pain and shortness of breath . He states that has been feeling left chest discomfort since Thursday on/off worsening sharp pain with no complete relieve, Radiates to his back and left shoulder with associated dyspnea on exertion with heavy work , chest pain 4/10 , admits drinking 4-6 beers almost daily in the evening , last drink was last night . Evaluated in the ED by cardiology  CTA of coronaries done showed CAD plaque , now being admitted for cardiac cath unstable angina .  CABG x 3 performed utilizing bilateral SORAIDA's and left radial artery.  Left SORAIDA anastomosis to LAD and left radial artery anastomosis to OM1 on CPB 5/7.  Right SORAIDA anastomosis to PDA performed off bypass. 5/7

## 2019-05-09 NOTE — PROGRESS NOTE ADULT - SUBJECTIVE AND OBJECTIVE BOX
Subjective: no c/o incisional pain at this time. Denies CP, SOB, palpitations, N/V, other c/o.    T(C): 37.5 (05-09-19 @ 01:22), Max: 37.7 (05-08-19 @ 04:00)  HR: 75 (05-09-19 @ 02:00) (75 - 101)  BP: 144/87 (05-08-19 @ 19:00) (144/87 - 144/87)  ABP: 109/52 (05-09-19 @ 02:00) (109/52 - 165/75)  ABP(mean): 71 (05-09-19 @ 02:00) (71 - 103)  RR: 17 (05-09-19 @ 02:00) (11 - 28)  SpO2: 97% (05-09-19 @ 02:00) (93% - 99%)  Wt(kg): --  CVP(mm Hg): 5 (05-09-19 @ 02:00) (3 - 13)  CO: --  CI: --  PA: --       I&O's Detail    07 May 2019 07:01  -  08 May 2019 07:00  --------------------------------------------------------  IN:    Albumin 5%  - 250 mL: 750 mL    diltiazem Infusion: 2.5 mL    diltiazem Infusion: 77.5 mL    insulin regular Infusion: 51 mL    Lactated Ringers IV Bolus: 250 mL    norepinephrine Infusion: 97 mL    norepinephrine Infusion: 4 mL    Oral Fluid: 200 mL    propofol Infusion: 22 mL    sodium chloride 0.9%.: 180 mL    sodium chloride 0.9%.: 90 mL    Solution: 100 mL    Solution: 100 mL    Solution: 250 mL    Solution: 50 mL  Total IN: 2224 mL    OUT:    Chest Tube: 680 mL    Chest Tube: 400 mL    Chest Tube: 180 mL    Indwelling Catheter - Urethral: 1935 mL  Total OUT: 3195 mL    Total NET: -971 mL      08 May 2019 07:01  -  09 May 2019 03:57  --------------------------------------------------------  IN:    diltiazem Infusion: 30 mL    insulin regular Infusion: 19.5 mL    Oral Fluid: 1380 mL    sodium chloride 0.9%.: 85 mL    sodium chloride 0.9%.: 170 mL    Solution: 100 mL    Solution: 100 mL    Solution: 500 mL  Total IN: 2384.5 mL    OUT:    Chest Tube: 100 mL    Chest Tube: 90 mL    Chest Tube: 100 mL    Indwelling Catheter - Urethral: 1160 mL  Total OUT: 1450 mL    Total NET: 934.5 mL          LABS: All Lab data reviewed and analyzed                        9.1    14.2  )-----------( 170      ( 08 May 2019 02:30 )             28.4     05-08    138  |  104  |  18.0  ----------------------------<  136<H>  4.5   |  23.0  |  1.04    Ca    8.3<L>      08 May 2019 02:30  Phos  2.8     05-08  Mg     1.9     05-08    TPro  6.4<L>  /  Alb  3.4  /  TBili  1.1  /  DBili  x   /  AST  34  /  ALT  18  /  AlkPhos  65  05-07    PT/INR - ( 08 May 2019 02:30 )   PT: 14.9 sec;   INR: 1.29 ratio         PTT - ( 08 May 2019 02:30 )  PTT:27.5 sec      ABG - ( 07 May 2019 23:19 )  pH, Arterial: 7.37  pH, Blood: x     /  pCO2: 42    /  pO2: 77    / HCO3: 24    / Base Excess: -0.9  /  SaO2: 96                CAPILLARY BLOOD GLUCOSE      POCT Blood Glucose.: 152 mg/dL (08 May 2019 23:24)  POCT Blood Glucose.: 147 mg/dL (08 May 2019 20:55)  POCT Blood Glucose.: 149 mg/dL (08 May 2019 18:46)  POCT Blood Glucose.: 153 mg/dL (08 May 2019 17:06)  POCT Blood Glucose.: 157 mg/dL (08 May 2019 16:07)  POCT Blood Glucose.: 152 mg/dL (08 May 2019 15:10)  POCT Blood Glucose.: 177 mg/dL (08 May 2019 14:06)  POCT Blood Glucose.: 130 mg/dL (08 May 2019 12:00)  POCT Blood Glucose.: 129 mg/dL (08 May 2019 10:02)  POCT Blood Glucose.: 126 mg/dL (08 May 2019 08:01)  POCT Blood Glucose.: 136 mg/dL (08 May 2019 06:45)  POCT Blood Glucose.: 138 mg/dL (08 May 2019 05:34)           RADIOLOGY: - Reviewed and analyzed   CXR: pending    HOSPITAL MEDICATIONS: All medications reviewed and analyzed  MEDICATIONS  (STANDING):  aspirin enteric coated 325 milliGRAM(s) Oral daily  atorvastatin 40 milliGRAM(s) Oral at bedtime  cefuroxime  IVPB 1500 milliGRAM(s) IV Intermittent every 8 hours  chlorhexidine 2% Cloths 1 Application(s) Topical daily  dextrose 5%. 1000 milliLiter(s) (50 mL/Hr) IV Continuous <Continuous>  dextrose 50% Injectable 12.5 Gram(s) IV Push once  dextrose 50% Injectable 25 Gram(s) IV Push once  dextrose 50% Injectable 25 Gram(s) IV Push once  dextrose 50% Injectable 50 milliLiter(s) IV Push every 15 minutes  dextrose 50% Injectable 25 milliLiter(s) IV Push every 15 minutes  diltiazem    Tablet 30 milliGRAM(s) Oral three times a day  docusate sodium 100 milliGRAM(s) Oral three times a day  enoxaparin Injectable 40 milliGRAM(s) SubCutaneous daily  furosemide    Tablet 20 milliGRAM(s) Oral every 12 hours  gemfibrozil 600 milliGRAM(s) Oral two times a day  insulin lispro (HumaLOG) corrective regimen sliding scale   SubCutaneous three times a day before meals  insulin lispro Injectable (HumaLOG) 2 Unit(s) SubCutaneous three times a day before meals  insulin regular Infusion 2 Unit(s)/Hr (2 mL/Hr) IV Continuous <Continuous>  metoprolol tartrate 25 milliGRAM(s) Oral two times a day  pantoprazole    Tablet 40 milliGRAM(s) Oral before breakfast  senna 2 Tablet(s) Oral at bedtime  sodium chloride 0.9%. 1000 milliLiter(s) (10 mL/Hr) IV Continuous <Continuous>  sodium chloride 0.9%. 1000 milliLiter(s) (5 mL/Hr) IV Continuous <Continuous>  vancomycin  IVPB 1000 milliGRAM(s) IV Intermittent every 12 hours    MEDICATIONS  (PRN):  dextrose 40% Gel 15 Gram(s) Oral once PRN Blood Glucose LESS THAN 70 milliGRAM(s)/deciliter  glucagon  Injectable 1 milliGRAM(s) IntraMuscular once PRN Glucose LESS THAN 70 milligrams/deciliter  oxyCODONE    5 mG/acetaminophen 325 mG 1 Tablet(s) Oral every 4 hours PRN Moderate Pain (4 - 6)  oxyCODONE    5 mG/acetaminophen 325 mG 2 Tablet(s) Oral every 4 hours PRN Severe Pain (7 - 10)  polyethylene glycol 3350 17 Gram(s) Oral daily PRN Constipation          Lines:     Physical Exam  Neuro: A+O x 3, non-focal, speech clear and intact  HEENT:  NCAT, PERRL, EOMI. No conjuctival edema or iIcterus, no thrush.  No ETT or NGT/OGT  Neck:  ROM intact, no JVD, no nodes or masses palpable, trachea midline, no tracheostomy  Pulm: CTA, good air entry, equal bilaterally, no rales/rhonchi/wheezing, no accessory muscle use noted  Chest:       right, mediastinal CT and left pleural CT all with dressings intact and no air leak, no subQ emphysema       +PW attatched to pacing box  CV: RRR, S1, S2. No murmurs, rubs, or gallops noted.  Abd: +BSx4. Soft, nontender, nondistended.  : gallego in situ to bedside drainage  Ext: PARIKH x 4, no edema, no cyanosis or clubbing, distal motor/neuro/circ intact  Skin: warm, dry, no rashes  Incisions: midsternal C/D/I/stable w/ dressing        Case including assessment/plan of care discussed with   CT surgery attending.  Critical Care time: 25     minutes of noncontinuos critical care time spent evaluating/treating, reviewing imaging, labs, discussing case with multidisciplinary team, discussing plans/goals of care with patient/family to prevent further life threatening depreciation of the patient's condition. Non-inclusive is procedure time.       68yMale with PMH     Transesophageal echocardiography ([facility])  CABG, using 3 arterial grafts      PAST MEDICAL & SURGICAL HISTORY:  ETOH abuse  Esophageal varices  Hyperlipidemia  Hypertension  No significant past surgical history

## 2019-05-10 ENCOUNTER — TRANSCRIPTION ENCOUNTER (OUTPATIENT)
Age: 69
End: 2019-05-10

## 2019-05-10 DIAGNOSIS — E87.6 HYPOKALEMIA: ICD-10-CM

## 2019-05-10 LAB
ANION GAP SERPL CALC-SCNC: 14 MMOL/L — SIGNIFICANT CHANGE UP (ref 5–17)
BUN SERPL-MCNC: 22 MG/DL — HIGH (ref 8–20)
CALCIUM SERPL-MCNC: 8.2 MG/DL — LOW (ref 8.6–10.2)
CHLORIDE SERPL-SCNC: 96 MMOL/L — LOW (ref 98–107)
CO2 SERPL-SCNC: 27 MMOL/L — SIGNIFICANT CHANGE UP (ref 22–29)
CREAT SERPL-MCNC: 0.88 MG/DL — SIGNIFICANT CHANGE UP (ref 0.5–1.3)
GLUCOSE BLDC GLUCOMTR-MCNC: 102 MG/DL — HIGH (ref 70–99)
GLUCOSE BLDC GLUCOMTR-MCNC: 122 MG/DL — HIGH (ref 70–99)
GLUCOSE BLDC GLUCOMTR-MCNC: 149 MG/DL — HIGH (ref 70–99)
GLUCOSE BLDC GLUCOMTR-MCNC: 99 MG/DL — SIGNIFICANT CHANGE UP (ref 70–99)
GLUCOSE SERPL-MCNC: 95 MG/DL — SIGNIFICANT CHANGE UP (ref 70–115)
HCT VFR BLD CALC: 24.3 % — LOW (ref 42–52)
HGB BLD-MCNC: 7.9 G/DL — LOW (ref 14–18)
MAGNESIUM SERPL-MCNC: 2 MG/DL — SIGNIFICANT CHANGE UP (ref 1.8–2.6)
MCHC RBC-ENTMCNC: 23.7 PG — LOW (ref 27–31)
MCHC RBC-ENTMCNC: 32.5 G/DL — SIGNIFICANT CHANGE UP (ref 32–36)
MCV RBC AUTO: 73 FL — LOW (ref 80–94)
PLATELET # BLD AUTO: 171 K/UL — SIGNIFICANT CHANGE UP (ref 150–400)
POTASSIUM SERPL-MCNC: 3.9 MMOL/L — SIGNIFICANT CHANGE UP (ref 3.5–5.3)
POTASSIUM SERPL-SCNC: 3.9 MMOL/L — SIGNIFICANT CHANGE UP (ref 3.5–5.3)
RBC # BLD: 3.33 M/UL — LOW (ref 4.6–6.2)
RBC # FLD: 15.7 % — HIGH (ref 11–15.6)
SODIUM SERPL-SCNC: 137 MMOL/L — SIGNIFICANT CHANGE UP (ref 135–145)
WBC # BLD: 11.2 K/UL — HIGH (ref 4.8–10.8)
WBC # FLD AUTO: 11.2 K/UL — HIGH (ref 4.8–10.8)

## 2019-05-10 PROCEDURE — 99232 SBSQ HOSP IP/OBS MODERATE 35: CPT | Mod: 24

## 2019-05-10 PROCEDURE — 71045 X-RAY EXAM CHEST 1 VIEW: CPT | Mod: 26

## 2019-05-10 PROCEDURE — 93010 ELECTROCARDIOGRAM REPORT: CPT

## 2019-05-10 RX ORDER — FERROUS SULFATE 325(65) MG
325 TABLET ORAL THREE TIMES A DAY
Refills: 0 | Status: DISCONTINUED | OUTPATIENT
Start: 2019-05-10 | End: 2019-05-12

## 2019-05-10 RX ORDER — FOLIC ACID 0.8 MG
1 TABLET ORAL DAILY
Refills: 0 | Status: DISCONTINUED | OUTPATIENT
Start: 2019-05-10 | End: 2019-05-12

## 2019-05-10 RX ORDER — POTASSIUM CHLORIDE 20 MEQ
20 PACKET (EA) ORAL ONCE
Refills: 0 | Status: COMPLETED | OUTPATIENT
Start: 2019-05-10 | End: 2019-05-10

## 2019-05-10 RX ORDER — ASCORBIC ACID 60 MG
500 TABLET,CHEWABLE ORAL DAILY
Refills: 0 | Status: DISCONTINUED | OUTPATIENT
Start: 2019-05-10 | End: 2019-05-12

## 2019-05-10 RX ADMIN — ENOXAPARIN SODIUM 40 MILLIGRAM(S): 100 INJECTION SUBCUTANEOUS at 21:45

## 2019-05-10 RX ADMIN — Medication 30 MILLIGRAM(S): at 21:45

## 2019-05-10 RX ADMIN — Medication 2 UNIT(S): at 08:48

## 2019-05-10 RX ADMIN — SENNA PLUS 2 TABLET(S): 8.6 TABLET ORAL at 21:45

## 2019-05-10 RX ADMIN — Medication 500 MILLIGRAM(S): at 12:24

## 2019-05-10 RX ADMIN — Medication 100 MILLIGRAM(S): at 05:29

## 2019-05-10 RX ADMIN — Medication 325 MILLIGRAM(S): at 21:45

## 2019-05-10 RX ADMIN — CHLORHEXIDINE GLUCONATE 1 APPLICATION(S): 213 SOLUTION TOPICAL at 08:13

## 2019-05-10 RX ADMIN — Medication 25 MILLIGRAM(S): at 05:29

## 2019-05-10 RX ADMIN — Medication 1 MILLIGRAM(S): at 12:24

## 2019-05-10 RX ADMIN — Medication 325 MILLIGRAM(S): at 15:04

## 2019-05-10 RX ADMIN — Medication 30 MILLIGRAM(S): at 05:28

## 2019-05-10 RX ADMIN — Medication 2 UNIT(S): at 12:24

## 2019-05-10 RX ADMIN — Medication 40 MILLIGRAM(S): at 05:28

## 2019-05-10 RX ADMIN — Medication 600 MILLIGRAM(S): at 05:29

## 2019-05-10 RX ADMIN — Medication 40 MILLIGRAM(S): at 17:56

## 2019-05-10 RX ADMIN — Medication 20 MILLIEQUIVALENT(S): at 12:24

## 2019-05-10 RX ADMIN — Medication 30 MILLIGRAM(S): at 15:04

## 2019-05-10 RX ADMIN — Medication 100 MILLIGRAM(S): at 15:04

## 2019-05-10 RX ADMIN — Medication 600 MILLIGRAM(S): at 17:56

## 2019-05-10 RX ADMIN — SODIUM CHLORIDE 3 MILLILITER(S): 9 INJECTION INTRAMUSCULAR; INTRAVENOUS; SUBCUTANEOUS at 15:05

## 2019-05-10 RX ADMIN — Medication 325 MILLIGRAM(S): at 12:24

## 2019-05-10 RX ADMIN — Medication 100 MILLIGRAM(S): at 21:45

## 2019-05-10 RX ADMIN — Medication 25 MILLIGRAM(S): at 17:56

## 2019-05-10 RX ADMIN — ATORVASTATIN CALCIUM 40 MILLIGRAM(S): 80 TABLET, FILM COATED ORAL at 21:45

## 2019-05-10 RX ADMIN — PANTOPRAZOLE SODIUM 40 MILLIGRAM(S): 20 TABLET, DELAYED RELEASE ORAL at 05:29

## 2019-05-10 RX ADMIN — SODIUM CHLORIDE 3 MILLILITER(S): 9 INJECTION INTRAMUSCULAR; INTRAVENOUS; SUBCUTANEOUS at 06:16

## 2019-05-10 RX ADMIN — SODIUM CHLORIDE 3 MILLILITER(S): 9 INJECTION INTRAMUSCULAR; INTRAVENOUS; SUBCUTANEOUS at 21:46

## 2019-05-10 NOTE — PROGRESS NOTE ADULT - SUBJECTIVE AND OBJECTIVE BOX
Subjective:  Pt in bed. No issues overnight     VITAL SIGNS  T(C): 36.6 (05-10-19 @ 10:01), Max: 37.2 (19 @ 17:44)  HR: 77 (05-10-19 @ 10:01) (70 - 83)  BP: 133/69 (05-10-19 @ 10:01) (94/53 - 133/69)  RR: 16 (05-10-19 @ 10:01) (11 - 48)  SpO2: 97% (05-10-19 @ 10:01) (92% - 98%) RA           Daily     Daily Weight in k.7 (10 May 2019 05:35)  Admit Wt: Drug Dosing Weight  Height (cm): 175.26 (07 May 2019 05:55)  Weight (kg): 105.9 (07 May 2019 05:55)    Telemetry:   SR   LVEF:  50%    MEDICATIONS  ascorbic acid 500 milliGRAM(s) Oral daily  aspirin enteric coated 325 milliGRAM(s) Oral daily  atorvastatin 40 milliGRAM(s) Oral at bedtime  chlorhexidine 2% Cloths 1 Application(s) Topical daily  dextrose 40% Gel 15 Gram(s) Oral once PRN  dextrose 5%. 1000 milliLiter(s) IV Continuous <Continuous>  dextrose 50% Injectable 12.5 Gram(s) IV Push once  dextrose 50% Injectable 25 Gram(s) IV Push once  dextrose 50% Injectable 25 Gram(s) IV Push once  dextrose 50% Injectable 50 milliLiter(s) IV Push every 15 minutes  dextrose 50% Injectable 25 milliLiter(s) IV Push every 15 minutes  diltiazem    Tablet 30 milliGRAM(s) Oral three times a day  docusate sodium 100 milliGRAM(s) Oral three times a day  enoxaparin Injectable 40 milliGRAM(s) SubCutaneous daily  ferrous    sulfate 325 milliGRAM(s) Oral three times a day  folic acid 1 milliGRAM(s) Oral daily  furosemide    Tablet 40 milliGRAM(s) Oral every 12 hours  gemfibrozil 600 milliGRAM(s) Oral two times a day  glucagon  Injectable 1 milliGRAM(s) IntraMuscular once PRN  insulin lispro (HumaLOG) corrective regimen sliding scale   SubCutaneous three times a day before meals  insulin lispro Injectable (HumaLOG) 2 Unit(s) SubCutaneous three times a day before meals  metoprolol tartrate 25 milliGRAM(s) Oral two times a day  oxyCODONE    5 mG/acetaminophen 325 mG 1 Tablet(s) Oral every 4 hours PRN  oxyCODONE    5 mG/acetaminophen 325 mG 2 Tablet(s) Oral every 4 hours PRN  pantoprazole    Tablet 40 milliGRAM(s) Oral before breakfast  polyethylene glycol 3350 17 Gram(s) Oral daily PRN  senna 2 Tablet(s) Oral at bedtime  sodium chloride 0.9% lock flush 3 milliLiter(s) IV Push every 8 hours  sodium chloride 0.9%. 1000 milliLiter(s) IV Continuous <Continuous>  sodium chloride 0.9%. 1000 milliLiter(s) IV Continuous <Continuous>    MEDICATIONS  (PRN):  dextrose 40% Gel 15 Gram(s) Oral once PRN Blood Glucose LESS THAN 70 milliGRAM(s)/deciliter  glucagon  Injectable 1 milliGRAM(s) IntraMuscular once PRN Glucose LESS THAN 70 milligrams/deciliter  oxyCODONE    5 mG/acetaminophen 325 mG 1 Tablet(s) Oral every 4 hours PRN Moderate Pain (4 - 6)  oxyCODONE    5 mG/acetaminophen 325 mG 2 Tablet(s) Oral every 4 hours PRN Severe Pain (7 - 10)  polyethylene glycol 3350 17 Gram(s) Oral daily PRN Constipation        PHYSICAL EXAM    Neuro: A+O x 3, non-focal, speech clear and intact  Pulm: CTA, good air entry, equal bilaterally, no rales/rhonchi/wheezing, no accessory muscle use note      CV: RRR, S1, S2. No murmurs, rubs, or gallops noted.  Abd: +BSx4. Soft, nontender, nondistended.- BM  Ext: PARIKH x 4, no edema, no cyanosis or clubbing, distal motor/neuro/circ intact  Skin: warm, dry, no rashes  Incisions: midsternal C/D/I/stable w/ dressing +PW attatched to pacing box    I&O's Detail    09 May 2019 07:01  -  10 May 2019 07:00  --------------------------------------------------------  IN:    Oral Fluid: 600 mL    Packed Red Blood Cells: 302 mL    Solution: 250 mL  Total IN: 1152 mL    OUT:    Chest Tube: 40 mL    Voided: 4325 mL  Total OUT: 4365 mL    Total NET: -3213 mL      10 May 2019 07:01  -  10 May 2019 12:35  --------------------------------------------------------  IN:    Oral Fluid: 360 mL  Total IN: 360 mL    OUT:  Total OUT: 0 mL    Total NET: 360 mL          LABS  05-10    137  |  96<L>  |  22.0<H>  ----------------------------<  95  3.9   |  27.0  |  0.88    Ca    8.2<L>      10 May 2019 06:32  Mg     2.0     05-10                                   7.9    11.2  )-----------( 171      ( 10 May 2019 06:32 )             24.3                     CAPILLARY BLOOD GLUCOSE      POCT Blood Glucose.: 122 mg/dL (10 May 2019 12:14)  POCT Blood Glucose.: 99 mg/dL (10 May 2019 08:07)  POCT Blood Glucose.: 124 mg/dL (09 May 2019 17:22)  POCT Blood Glucose.: 113 mg/dL (09 May 2019 12:37)           Today's CXR: < from: Xray Chest 1 View- PORTABLE-Urgent (19 @ 11:03) >    The lungs  are clear.  No pleural abnormality is seen.        The heart and mediastinum are within normal limits following open heart   surgery.     Visualized osseous structures are intact.        IMPRESSION:    Status post open heart surgery.   No evidence of active chest pathology.     < end of copied text >      PAST MEDICAL & SURGICAL HISTORY:  ETOH abuse  Esophageal varices  Hyperlipidemia  Hypertension  No significant past surgical history

## 2019-05-10 NOTE — PROGRESS NOTE ADULT - SUBJECTIVE AND OBJECTIVE BOX
[facility], [facility]                              [address] [address]                                                 PHONE: [phone]                                                 FAX: [phone]  -----------------------------------------------------------------------------------------------  Pt seen and examined. FU for CAD    Overnight events/Complaints: Pt with mild chest pain. Ambulated. Feels better.       Vital Signs Last 24 Hrs  T(C): 36.8 (10 May 2019 05:26), Max: 37.2 (09 May 2019 17:44)  T(F): 98.3 (10 May 2019 05:26), Max: 98.9 (09 May 2019 17:44)  HR: 79 (10 May 2019 05:26) (70 - 83)  BP: 107/68 (10 May 2019 05:26) (94/53 - 131/77)  BP(mean): 69 (09 May 2019 20:00) (69 - 103)  RR: 14 (10 May 2019 05:26) (11 - 48)  SpO2: 97% (10 May 2019 05:26) (92% - 98%)  I&O's Summary    09 May 2019 07:01  -  10 May 2019 07:00  --------------------------------------------------------  IN: 1152 mL / OUT: 4365 mL / NET: -3213 mL    PHYSICAL EXAM:  Constitutional: Appears well developed, well nourished; alert and co-operative  HEENT:     Head: Normocephalic and atraumatic  Neck: supple. No JVD  Cardiovascular: Normal S1 S2, No murmurs  Respiratory: L side chest tube  Gastrointestinal:  Soft, Non-tender, + BS	  Musculoskeletal: Normal range of motion. No edema  Skin: Warm and dry. No cyanosis . No diaphoresis.  Neurologic: Alert oriented to time place and person. Normal strength and no tremor.        LABS:                          7.9    11.2  )-----------( 171      ( 10 May 2019 06:32 )             24.3     05-10    137  |  96<L>  |  22.0<H>  ----------------------------<  95  3.9   |  27.0  |  0.88    Ca    8.2<L>      10 May 2019 06:32  Mg     2.0     05-10    RADIOLOGY & ADDITIONAL STUDIES: (reviewed)    CARDIOLOGY TESTING:(reviewed)     TELEMETRY (Independent visualization) : No arrhythmias    ECHOCARDIOGRAM:  < from: TTE Echo Complete w/Doppler (05.02.19 @ 11:49) >  Summary:   1. Left ventricular ejection fraction, by visual estimation, is 50 to   55%.   2. Normal global left ventricular systolic function.   3. Apical anterior segment, apical inferior segment, and apex are   abnormal as described above.   4. Spectral Doppler shows impaired relaxation pattern of left   ventricular myocardial filling (Grade I diastolic dysfunction).   5. There is mild concentric left ventricular hypertrophy.   6. Endocardial visualization was enhanced with intravenous echo contrast.    MD Mat Electronically signed on 5/2/2019 at 3:33:16 PM    < end of copied text >    CARDIAC CATHETERIZATION:  < from: Cardiac Cath Lab - Adult (05.01.19 @ 17:22) >  CORONARY VESSELS: The coronary circulation is right dominant.  LAD:   --  Proximal LAD: There was a tubular 90 % stenosis. The lesion was  irregularly contoured and calcified.  CX:   --  Mid circumflex: There was a [id number] % stenosis. The lesion was  irregularly contoured and ulcerated.  RCA:   --  Distal RCA: There was a 70 % stenosis.  COMPLICATIONS: No complications occurred during the cath lab visit.  DIAGNOSTIC RECOMMENDATIONS: Patient with abnormal CTA and UA  Cath reveals significant stenosis of the pLAD, mLCx and dRCA.  Plan: CABG eval with Dr Soto  Discussed with Dr Delvin Jacob ([facility])  Prepared and signed by  Patricio Simpson MD  Signed 05/01/2019 18:03:00    < end of copied text >      MEDICATIONS:(reviewed)  MEDICATIONS  (STANDING):  aspirin enteric coated 325 milliGRAM(s) Oral daily  atorvastatin 40 milliGRAM(s) Oral at bedtime  chlorhexidine 2% Cloths 1 Application(s) Topical daily  dextrose 5%. 1000 milliLiter(s) (50 mL/Hr) IV Continuous <Continuous>  dextrose 50% Injectable 12.5 Gram(s) IV Push once  dextrose 50% Injectable 25 Gram(s) IV Push once  dextrose 50% Injectable 25 Gram(s) IV Push once  dextrose 50% Injectable 50 milliLiter(s) IV Push every 15 minutes  dextrose 50% Injectable 25 milliLiter(s) IV Push every 15 minutes  diltiazem    Tablet 30 milliGRAM(s) Oral three times a day  docusate sodium 100 milliGRAM(s) Oral three times a day  enoxaparin Injectable 40 milliGRAM(s) SubCutaneous daily  furosemide    Tablet 40 milliGRAM(s) Oral every 12 hours  gemfibrozil 600 milliGRAM(s) Oral two times a day  insulin lispro (HumaLOG) corrective regimen sliding scale   SubCutaneous three times a day before meals  insulin lispro Injectable (HumaLOG) 2 Unit(s) SubCutaneous three times a day before meals  metoprolol tartrate 25 milliGRAM(s) Oral two times a day  pantoprazole    Tablet 40 milliGRAM(s) Oral before breakfast  senna 2 Tablet(s) Oral at bedtime  sodium chloride 0.9% lock flush 3 milliLiter(s) IV Push every 8 hours  sodium chloride 0.9%. 1000 milliLiter(s) (10 mL/Hr) IV Continuous <Continuous>  sodium chloride 0.9%. 1000 milliLiter(s) (5 mL/Hr) IV Continuous <Continuous>    MEDICATIONS  (PRN):  dextrose 40% Gel 15 Gram(s) Oral once PRN Blood Glucose LESS THAN 70 milliGRAM(s)/deciliter  glucagon  Injectable 1 milliGRAM(s) IntraMuscular once PRN Glucose LESS THAN 70 milligrams/deciliter  oxyCODONE    5 mG/acetaminophen 325 mG 1 Tablet(s) Oral every 4 hours PRN Moderate Pain (4 - 6)  oxyCODONE    5 mG/acetaminophen 325 mG 2 Tablet(s) Oral every 4 hours PRN Severe Pain (7 - 10)  polyethylene glycol 3350 17 Gram(s) Oral daily PRN Constipation    ASSESSMENT AND PLAN:    69 yo male obese with HTN , alcoholism presented to the ED with complaints of left sided chest pain and shortness of breath. Cath with TVD  S/P TV CABG using LIMA to LAD/ROMY to RCA and left radial to circ    1. CAD: Continue ASA, metoprolol and statin  2. Monitor H/H  3. Mx as per CTS

## 2019-05-10 NOTE — DISCHARGE NOTE NURSING/CASE MANAGEMENT/SOCIAL WORK - NSDCDPATPORTLINK_GEN_ALL_CORE
You can access the [facility] Patient Portal, offered by [facility], by registering with the following website: [email or web address]

## 2019-05-11 DIAGNOSIS — E83.42 HYPOMAGNESEMIA: ICD-10-CM

## 2019-05-11 LAB
ANION GAP SERPL CALC-SCNC: 13 MMOL/L — SIGNIFICANT CHANGE UP (ref 5–17)
BUN SERPL-MCNC: 19 MG/DL — SIGNIFICANT CHANGE UP (ref 8–20)
CALCIUM SERPL-MCNC: 8.5 MG/DL — LOW (ref 8.6–10.2)
CHLORIDE SERPL-SCNC: 98 MMOL/L — SIGNIFICANT CHANGE UP (ref 98–107)
CO2 SERPL-SCNC: 26 MMOL/L — SIGNIFICANT CHANGE UP (ref 22–29)
CREAT SERPL-MCNC: 0.9 MG/DL — SIGNIFICANT CHANGE UP (ref 0.5–1.3)
GLUCOSE BLDC GLUCOMTR-MCNC: 109 MG/DL — HIGH (ref 70–99)
GLUCOSE BLDC GLUCOMTR-MCNC: 122 MG/DL — HIGH (ref 70–99)
GLUCOSE SERPL-MCNC: 102 MG/DL — SIGNIFICANT CHANGE UP (ref 70–115)
HCT VFR BLD CALC: 25.7 % — LOW (ref 42–52)
HGB BLD-MCNC: 8.1 G/DL — LOW (ref 14–18)
MAGNESIUM SERPL-MCNC: 1.9 MG/DL — SIGNIFICANT CHANGE UP (ref 1.6–2.6)
MCHC RBC-ENTMCNC: 23.3 PG — LOW (ref 27–31)
MCHC RBC-ENTMCNC: 31.5 G/DL — LOW (ref 32–36)
MCV RBC AUTO: 74.1 FL — LOW (ref 80–94)
PHOSPHATE SERPL-MCNC: 2.9 MG/DL — SIGNIFICANT CHANGE UP (ref 2.4–4.7)
PLATELET # BLD AUTO: 222 K/UL — SIGNIFICANT CHANGE UP (ref 150–400)
POTASSIUM SERPL-MCNC: 3.9 MMOL/L — SIGNIFICANT CHANGE UP (ref 3.5–5.3)
POTASSIUM SERPL-SCNC: 3.9 MMOL/L — SIGNIFICANT CHANGE UP (ref 3.5–5.3)
RBC # BLD: 3.47 M/UL — LOW (ref 4.6–6.2)
RBC # FLD: 15.6 % — SIGNIFICANT CHANGE UP (ref 11–15.6)
SODIUM SERPL-SCNC: 137 MMOL/L — SIGNIFICANT CHANGE UP (ref 135–145)
WBC # BLD: 8.8 K/UL — SIGNIFICANT CHANGE UP (ref 4.8–10.8)
WBC # FLD AUTO: 8.8 K/UL — SIGNIFICANT CHANGE UP (ref 4.8–10.8)

## 2019-05-11 PROCEDURE — 93010 ELECTROCARDIOGRAM REPORT: CPT

## 2019-05-11 PROCEDURE — 99232 SBSQ HOSP IP/OBS MODERATE 35: CPT | Mod: 24

## 2019-05-11 PROCEDURE — 71045 X-RAY EXAM CHEST 1 VIEW: CPT | Mod: 26

## 2019-05-11 RX ORDER — POTASSIUM CHLORIDE 20 MEQ
20 PACKET (EA) ORAL DAILY
Refills: 0 | Status: DISCONTINUED | OUTPATIENT
Start: 2019-05-12 | End: 2019-05-12

## 2019-05-11 RX ORDER — POTASSIUM CHLORIDE 20 MEQ
20 PACKET (EA) ORAL ONCE
Refills: 0 | Status: COMPLETED | OUTPATIENT
Start: 2019-05-11 | End: 2019-05-11

## 2019-05-11 RX ORDER — MAGNESIUM SULFATE 500 MG/ML
2 VIAL (ML) INJECTION ONCE
Refills: 0 | Status: COMPLETED | OUTPATIENT
Start: 2019-05-11 | End: 2019-05-11

## 2019-05-11 RX ORDER — FUROSEMIDE 40 MG
40 TABLET ORAL DAILY
Refills: 0 | Status: DISCONTINUED | OUTPATIENT
Start: 2019-05-11 | End: 2019-05-12

## 2019-05-11 RX ADMIN — Medication 325 MILLIGRAM(S): at 09:07

## 2019-05-11 RX ADMIN — ATORVASTATIN CALCIUM 40 MILLIGRAM(S): 80 TABLET, FILM COATED ORAL at 21:24

## 2019-05-11 RX ADMIN — Medication 40 MILLIGRAM(S): at 05:39

## 2019-05-11 RX ADMIN — ENOXAPARIN SODIUM 40 MILLIGRAM(S): 100 INJECTION SUBCUTANEOUS at 21:24

## 2019-05-11 RX ADMIN — Medication 325 MILLIGRAM(S): at 13:39

## 2019-05-11 RX ADMIN — SODIUM CHLORIDE 3 MILLILITER(S): 9 INJECTION INTRAMUSCULAR; INTRAVENOUS; SUBCUTANEOUS at 21:25

## 2019-05-11 RX ADMIN — Medication 325 MILLIGRAM(S): at 21:24

## 2019-05-11 RX ADMIN — Medication 30 MILLIGRAM(S): at 05:39

## 2019-05-11 RX ADMIN — Medication 100 MILLIGRAM(S): at 05:39

## 2019-05-11 RX ADMIN — Medication 30 MILLIGRAM(S): at 13:39

## 2019-05-11 RX ADMIN — Medication 30 MILLIGRAM(S): at 21:24

## 2019-05-11 RX ADMIN — Medication 500 MILLIGRAM(S): at 09:08

## 2019-05-11 RX ADMIN — Medication 100 MILLIGRAM(S): at 21:24

## 2019-05-11 RX ADMIN — Medication 1 MILLIGRAM(S): at 09:08

## 2019-05-11 RX ADMIN — PANTOPRAZOLE SODIUM 40 MILLIGRAM(S): 20 TABLET, DELAYED RELEASE ORAL at 05:39

## 2019-05-11 RX ADMIN — SENNA PLUS 2 TABLET(S): 8.6 TABLET ORAL at 21:24

## 2019-05-11 RX ADMIN — Medication 600 MILLIGRAM(S): at 05:39

## 2019-05-11 RX ADMIN — Medication 50 GRAM(S): at 12:10

## 2019-05-11 RX ADMIN — SODIUM CHLORIDE 3 MILLILITER(S): 9 INJECTION INTRAMUSCULAR; INTRAVENOUS; SUBCUTANEOUS at 13:14

## 2019-05-11 RX ADMIN — Medication 20 MILLIEQUIVALENT(S): at 12:10

## 2019-05-11 RX ADMIN — Medication 25 MILLIGRAM(S): at 17:47

## 2019-05-11 RX ADMIN — SODIUM CHLORIDE 3 MILLILITER(S): 9 INJECTION INTRAMUSCULAR; INTRAVENOUS; SUBCUTANEOUS at 05:34

## 2019-05-11 RX ADMIN — Medication 100 MILLIGRAM(S): at 13:39

## 2019-05-11 RX ADMIN — Medication 600 MILLIGRAM(S): at 17:47

## 2019-05-11 RX ADMIN — Medication 25 MILLIGRAM(S): at 05:39

## 2019-05-11 RX ADMIN — CHLORHEXIDINE GLUCONATE 1 APPLICATION(S): 213 SOLUTION TOPICAL at 12:06

## 2019-05-11 RX ADMIN — Medication 325 MILLIGRAM(S): at 05:39

## 2019-05-11 NOTE — PROGRESS NOTE ADULT - SUBJECTIVE AND OBJECTIVE BOX
Subjective:  Pt in Chair.  NAD no issues overnight     VITAL SIGNS  T(C): 36.9 (19 @ 05:34), Max: 37.2 (05-10-19 @ 17:42)  HR: 78 (19 @ 09:00) (64 - 88)  BP: 118/70 (19 @ 09:00) (98/64 - 122/77)  RR: 16 (19 @ 05:34) (16 - 18)  SpO2: 98% (19 @ 05:34) (95% - 98%) RA           Daily     Daily Weight in k.9 (11 May 2019 05:00)  Admit Wt: Drug Dosing Weight  Height (cm): 175.26 (07 May 2019 05:55)  Weight (kg): 105.9 (07 May 2019 05:55)    Telemetry:   SR   LVEF:  50%    MEDICATIONS  ascorbic acid 500 milliGRAM(s) Oral daily  aspirin enteric coated 325 milliGRAM(s) Oral daily  atorvastatin 40 milliGRAM(s) Oral at bedtime  chlorhexidine 2% Cloths 1 Application(s) Topical daily  diltiazem    Tablet 30 milliGRAM(s) Oral three times a day  docusate sodium 100 milliGRAM(s) Oral three times a day  enoxaparin Injectable 40 milliGRAM(s) SubCutaneous daily  ferrous    sulfate 325 milliGRAM(s) Oral three times a day  folic acid 1 milliGRAM(s) Oral daily  furosemide    Tablet 40 milliGRAM(s) Oral daily  gemfibrozil 600 milliGRAM(s) Oral two times a day  glucagon  Injectable 1 milliGRAM(s) IntraMuscular once PRN  metoprolol tartrate 25 milliGRAM(s) Oral two times a day  oxyCODONE    5 mG/acetaminophen 325 mG 1 Tablet(s) Oral every 4 hours PRN  oxyCODONE    5 mG/acetaminophen 325 mG 2 Tablet(s) Oral every 4 hours PRN  pantoprazole    Tablet 40 milliGRAM(s) Oral before breakfast  polyethylene glycol 3350 17 Gram(s) Oral daily PRN  senna 2 Tablet(s) Oral at bedtime  sodium chloride 0.9% lock flush 3 milliLiter(s) IV Push every 8 hours    MEDICATIONS  (PRN):  glucagon  Injectable 1 milliGRAM(s) IntraMuscular once PRN Glucose LESS THAN 70 milligrams/deciliter  oxyCODONE    5 mG/acetaminophen 325 mG 1 Tablet(s) Oral every 4 hours PRN Moderate Pain (4 - 6)  oxyCODONE    5 mG/acetaminophen 325 mG 2 Tablet(s) Oral every 4 hours PRN Severe Pain (7 - 10)  polyethylene glycol 3350 17 Gram(s) Oral daily PRN Constipation        PHYSICAL EXAM  Neuro: A+O x 3, non-focal, speech clear and intact  Pulm: CTA, good air entry, equal bilaterally, no rales/rhonchi/wheezing, no accessory muscle use note      CV: RRR, S1, S2. No murmurs, rubs, or gallops noted.  Abd: +BSx4. Soft, nontender, nondistended.+ BM  Ext: PARIKH x 4, no edema, no cyanosis or clubbing, distal motor/neuro/circ intact  Skin: warm, dry, no rashes  Incisions: midsternal C/D/I/stable w/ dressing +PW       I&O's Detail    10 May 2019 07:  -  11 May 2019 07:00  --------------------------------------------------------  IN:    Oral Fluid: 600 mL  Total IN: 600 mL    OUT:  Total OUT: 0 mL    Total NET: 600 mL      11 May 2019 07:  -  11 May 2019 12:28  --------------------------------------------------------  IN:    Solution: 50 mL  Total IN: 50 mL    OUT:  Total OUT: 0 mL    Total NET: 50 mL          LABS      137  |  98  |  19.0  ----------------------------<  102  3.9   |  26.0  |  0.90    Ca    8.5<L>      11 May 2019 06:10  Phos  2.9       Mg     1.9                                        8.1    8.8   )-----------( 222      ( 11 May 2019 06:10 )             25.7                     CAPILLARY BLOOD GLUCOSE      POCT Blood Glucose.: 122 mg/dL (11 May 2019 12:08)  POCT Blood Glucose.: 109 mg/dL (11 May 2019 08:15)  POCT Blood Glucose.: 149 mg/dL (10 May 2019 21:35)  POCT Blood Glucose.: 102 mg/dL (10 May 2019 17:14)           Today's CXR:  < from: Xray Chest 1 View- PORTABLE-Routine (05.10.19 @ 05:57) >  Single frontal view of the chest demonstrates bilateral lower lobe   segmental atelectasis. Mediastinal sternotomy wires. Low lung volumes.   The cardiomediastinal silhouette is enlarged. No acute osseous   abnormalities. Overlying EKG leads and wires are noted    IMPRESSION: Bilateral lower lobe segmental atelectasis.    < end of copied text >      PAST MEDICAL & SURGICAL HISTORY:  ETOH abuse  Esophageal varices  Hyperlipidemia  Hypertension  No significant past surgical history

## 2019-05-11 NOTE — PROGRESS NOTE ADULT - PROBLEM SELECTOR PROBLEM 4
BPH with obstruction/lower urinary tract symptoms
ETOH abuse
BPH with obstruction/lower urinary tract symptoms

## 2019-05-11 NOTE — PROGRESS NOTE ADULT - SUBJECTIVE AND OBJECTIVE BOX
[facility], [facility]                              [address] [address]                                                 PHONE: [phone]                                                 FAX: [phone]  -----------------------------------------------------------------------------------------------  Pt seen and examined. FU for CAD    Overnight events/Complaints: Pt with mild chest pain. Ambulated. Feels better.       Vital Signs Last 24 Hrs  T(C): 36.8 (10 May 2019 05:26), Max: 37.2 (09 May 2019 17:44)  T(F): 98.3 (10 May 2019 05:26), Max: 98.9 (09 May 2019 17:44)  HR: 79 (10 May 2019 05:26) (70 - 83)  BP: 107/68 (10 May 2019 05:26) (94/53 - 131/77)  BP(mean): 69 (09 May 2019 20:00) (69 - 103)  RR: 14 (10 May 2019 05:26) (11 - 48)  SpO2: 97% (10 May 2019 05:26) (92% - 98%)  I&O's Summary    09 May 2019 07:01  -  10 May 2019 07:00  --------------------------------------------------------  IN: 1152 mL / OUT: 4365 mL / NET: -3213 mL    PHYSICAL EXAM:  Constitutional: Appears well developed, well nourished; alert and co-operative  HEENT:     Head: Normocephalic and atraumatic  Neck: supple. No JVD  Cardiovascular: Normal S1 S2, No murmurs  Respiratory: L side chest tube  Gastrointestinal:  Soft, Non-tender, + BS	  Musculoskeletal: Normal range of motion. No edema  Skin: Warm and dry. No cyanosis . No diaphoresis.  Neurologic: Alert oriented to time place and person. Normal strength and no tremor.        LABS:                          7.9    11.2  )-----------( 171      ( 10 May 2019 06:32 )             24.3     05-10    137  |  96<L>  |  22.0<H>  ----------------------------<  95  3.9   |  27.0  |  0.88    Ca    8.2<L>      10 May 2019 06:32  Mg     2.0     05-10    RADIOLOGY & ADDITIONAL STUDIES: (reviewed)    CARDIOLOGY TESTING:(reviewed)     TELEMETRY (Independent visualization) : No arrhythmias    ECHOCARDIOGRAM:  < from: TTE Echo Complete w/Doppler (05.02.19 @ 11:49) >  Summary:   1. Left ventricular ejection fraction, by visual estimation, is 50 to   55%.   2. Normal global left ventricular systolic function.   3. Apical anterior segment, apical inferior segment, and apex are   abnormal as described above.   4. Spectral Doppler shows impaired relaxation pattern of left   ventricular myocardial filling (Grade I diastolic dysfunction).   5. There is mild concentric left ventricular hypertrophy.   6. Endocardial visualization was enhanced with intravenous echo contrast.    MD Mat Electronically signed on 5/2/2019 at 3:33:16 PM    < end of copied text >    CARDIAC CATHETERIZATION:  < from: Cardiac Cath Lab - Adult (05.01.19 @ 17:22) >  CORONARY VESSELS: The coronary circulation is right dominant.  LAD:   --  Proximal LAD: There was a tubular 90 % stenosis. The lesion was  irregularly contoured and calcified.  CX:   --  Mid circumflex: There was a [id number] % stenosis. The lesion was  irregularly contoured and ulcerated.  RCA:   --  Distal RCA: There was a 70 % stenosis.  COMPLICATIONS: No complications occurred during the cath lab visit.  DIAGNOSTIC RECOMMENDATIONS: Patient with abnormal CTA and UA  Cath reveals significant stenosis of the pLAD, mLCx and dRCA.  Plan: CABG eval with Dr Soto  Discussed with Dr Delvin Jacob ([facility])  Prepared and signed by  Patricio Simpson MD  Signed 05/01/2019 18:03:00    < end of copied text >      MEDICATIONS:(reviewed)  MEDICATIONS  (STANDING):  aspirin enteric coated 325 milliGRAM(s) Oral daily  atorvastatin 40 milliGRAM(s) Oral at bedtime  chlorhexidine 2% Cloths 1 Application(s) Topical daily  dextrose 5%. 1000 milliLiter(s) (50 mL/Hr) IV Continuous <Continuous>  dextrose 50% Injectable 12.5 Gram(s) IV Push once  dextrose 50% Injectable 25 Gram(s) IV Push once  dextrose 50% Injectable 25 Gram(s) IV Push once  dextrose 50% Injectable 50 milliLiter(s) IV Push every 15 minutes  dextrose 50% Injectable 25 milliLiter(s) IV Push every 15 minutes  diltiazem    Tablet 30 milliGRAM(s) Oral three times a day  docusate sodium 100 milliGRAM(s) Oral three times a day  enoxaparin Injectable 40 milliGRAM(s) SubCutaneous daily  furosemide    Tablet 40 milliGRAM(s) Oral every 12 hours  gemfibrozil 600 milliGRAM(s) Oral two times a day  insulin lispro (HumaLOG) corrective regimen sliding scale   SubCutaneous three times a day before meals  insulin lispro Injectable (HumaLOG) 2 Unit(s) SubCutaneous three times a day before meals  metoprolol tartrate 25 milliGRAM(s) Oral two times a day  pantoprazole    Tablet 40 milliGRAM(s) Oral before breakfast  senna 2 Tablet(s) Oral at bedtime  sodium chloride 0.9% lock flush 3 milliLiter(s) IV Push every 8 hours  sodium chloride 0.9%. 1000 milliLiter(s) (10 mL/Hr) IV Continuous <Continuous>  sodium chloride 0.9%. 1000 milliLiter(s) (5 mL/Hr) IV Continuous <Continuous>    MEDICATIONS  (PRN):  dextrose 40% Gel 15 Gram(s) Oral once PRN Blood Glucose LESS THAN 70 milliGRAM(s)/deciliter  glucagon  Injectable 1 milliGRAM(s) IntraMuscular once PRN Glucose LESS THAN 70 milligrams/deciliter  oxyCODONE    5 mG/acetaminophen 325 mG 1 Tablet(s) Oral every 4 hours PRN Moderate Pain (4 - 6)  oxyCODONE    5 mG/acetaminophen 325 mG 2 Tablet(s) Oral every 4 hours PRN Severe Pain (7 - 10)  polyethylene glycol 3350 17 Gram(s) Oral daily PRN Constipation    ASSESSMENT AND PLAN:    69 yo male obese with HTN , alcoholism presented to the ED with complaints of left sided chest pain and shortness of breath. Cath with TVD  S/P TV CABG using LIMA to LAD/ROMY to RCA and left radial to circ    Doing well OOB   Presently in tele    1. CAD: Continue ASA, metoprolol and statin  2. Monitor H/H  3. Mx as per CTS

## 2019-05-11 NOTE — PROGRESS NOTE ADULT - PROBLEM SELECTOR PLAN 2
Lopressor when appropriate
Patient educated about wrist site care, signs and symptoms of complication post procedure, and plan of care moving forward. Patient verbalized understanding with teach-back.
Preop for CABG with Dr Soto 5/7 Tuesday
continue lopressor
continue lopressor
continue lopressor and diltiazem
Continue DASH diet.  Continue Lopressor

## 2019-05-11 NOTE — PROGRESS NOTE ADULT - PROBLEM SELECTOR PLAN 3
Statin   DASH diet
Optimize lopressor
Optimize lopressor  On Norvasc
Statin   DASH diet
controlled  continue amlodipine, metoprolol
lopressor
Continue DASH diet.  Continue Lipitor

## 2019-05-11 NOTE — PROGRESS NOTE ADULT - PROBLEM SELECTOR PLAN 4
Continue Flomax  Per pt, straight cath selfs every 10 days   Monitor I/O's
Cont Flowmax  Maintain gallego, monitor I&Os
CIWA score 0  PRN ativan  ETOH cessation reinforced.
Cont Flomax
On CIWA protocol but asymptomatic
On CIWA protocol but asymptomatic  Abd US without evidence of cirrhosis per read
On CIWA protocol but asymptomatic  Abd US without evidence of cirrhosis per read

## 2019-05-11 NOTE — PROGRESS NOTE ADULT - ASSESSMENT
69 yo male obese with HTN , alcoholism presented to the ED with complaints of left sided chest pain and shortness of breath . He states that has been feeling left chest discomfort since Thursday on/off worsening sharp pain with no complete relieve, Radiates to his back and left shoulder with associated dyspnea on exertion with heavy work , chest pain 4/10 , admits drinking 4-6 beers almost daily in the evening , last drink was last night . Evaluated in the ED by cardiology  CTA of coronaries done showed CAD plaque , now being admitted for cardiac cath unstable angina .  CABG x 3 performed utilizing bilateral SORAIDA's and left radial artery.  Left SORAIDA anastomosis to LAD and left radial artery anastomosis to OM1 on CPB 5/7.  Right SORAIDA anastomosis to PDA performed off bypass

## 2019-05-11 NOTE — PROGRESS NOTE ADULT - PROBLEM SELECTOR PLAN 6
Continue Protonix for GI prophylaxis  Continue Lovenox for DVT prophylaxis
DVT prophylaxis  GI prophylaxis
DVT prophylaxis  GI prophylaxis
DVT prophylaxis  GI prophylaxis  Cardizem for radial artery harvesting
DVT prophylaxis  GI prophylaxis  Cardizem for radial artery harvesting

## 2019-05-11 NOTE — PROGRESS NOTE ADULT - PROBLEM SELECTOR PLAN 1
S/P CABG 5/7/19  Radial grafts: Cardizem gtt (Transition off to PO)  OOB-Chair, ambulation, PT as tolerated.  PRN pain medication  Wean NC and encourage deep breathing IS, cough  chest PT nurse and PT  Cont ASA, lipitor (graft prophylaxis)  Lopressor when appropriate  Cont bowel regimen  Trend BUN/Cr, replace lytes PRN, maintain gallego and strict I&Os  Trend H/H  Cont post op ABX  Insulin gtt per protocol   Cont ICU supportive care  D/W team in AM rounds
I recommended a gallego being placed Sunday evening or Monday Morning to prevent need for an intraop urology consultation.  He refused.    A 16fr gallego may be able to be placed in the OR without difficulty as the patient catheterizes himself    I explained that it better to have an attempt at gallego placement preop to prevent delay of his major cardiac surgery    Will follow    Hopefully the patient changes his mind preop
On Nitrobid  Consider Imdur  Cardiology followup  BB  Continue aspirin  follow enzymes, ekg if pain progresses
On Nitrobid  Continue Imdur  Cardiology followup  BB  Continue aspirin  follow enzymes, ekg if pain progresses
S/P CABG 5/7/19  Radial grafts: Cardizem PO for 3 months as per Dr kandi KANG-Chair, ambulation, PT as tolerated.  PRN pain medication  Wean NC and encourage deep breathing IS, cough  chest PT nurse and PT  Cont ASA, lipitor (graft prophylaxis)  Lopressor when appropriate  Cont bowel regimen  Trend BUN/Cr, replace lytes PRN  Trend H/H  DW CT team in am   Cont post op ABX  plan to d/c home Sunday   isolate PW   D/W team in AM rounds
S/P CABG 5/7/19  Radial grafts: Cardizem PO for 3 months as per Dr kandi KANG-Chair, ambulation, PT as tolerated.  PRN pain medication  Wean NC and encourage deep breathing IS, cough  chest PT nurse and PT  Cont ASA, lipitor (graft prophylaxis)  Lopressor when appropriate  Cont bowel regimen  Trend BUN/Cr, replace lytes PRN, gallego removed 5/9  Trend H/H  Cont post op ABX  plan to d/c home Sunday   isolate PW   D/W team in AM rounds
S/P CABG 5/7/19  Radial grafts: Cardizem gtt (Transition off to PO) 5/8  OOB-Chair, ambulation, PT as tolerated.  PRN pain medication  Wean NC and encourage deep breathing IS, cough  chest PT nurse and PT  Cont ASA, lipitor (graft prophylaxis)  Lopressor when appropriate  Cont bowel regimen  Trend BUN/Cr, replace lytes PRN, gallego removed 5/9  Trend H/H  Cont post op ABX  Insulin gtt transitioned per protocol lantus, ISS, premeal   Cont ICU supportive care  D/W team in AM rounds
angina resolved   Continue Imdur  Cardiology followup  BB  Continue aspirin
s/p cath with pLAD 90%, mLCX 95%, RCA 70% consistent with TVD  continue ASA, atorvastatin, gemfibrozil, metoprolol  CABG workup in progress
Continue ASA, Lopressor, & lipitor  Pre-op work up in progress for CABG  Encourage the use of I/S, CDBE, increase mobilization  Continue DASH diet.  OHS scheduled for 5/7/19  Discussed plan with Dr. Soto & CTS in morning rounds.

## 2019-05-11 NOTE — PROGRESS NOTE ADULT - PROBLEM SELECTOR PROBLEM 2
HTN (hypertension)
Coronary artery disease involving native coronary artery of native heart with unstable angina pectoris
HTN (hypertension)
S/P coronary angiogram
Hypertension

## 2019-05-11 NOTE — PROGRESS NOTE ADULT - PROBLEM SELECTOR PROBLEM 3
Hyperlipidemia, unspecified hyperlipidemia type
Essential hypertension
HTN (hypertension)
Hyperlipidemia, unspecified hyperlipidemia type
Hyperlipidemia

## 2019-05-11 NOTE — PROGRESS NOTE ADULT - PROBLEM SELECTOR PLAN 5
Continue CIWA protocol
Cont with CIWA protocol
Continue Lipitor

## 2019-05-11 NOTE — PROGRESS NOTE ADULT - PROBLEM SELECTOR PROBLEM 5
ETOH abuse
Hyperlipidemia, unspecified hyperlipidemia type

## 2019-05-12 ENCOUNTER — TRANSCRIPTION ENCOUNTER (OUTPATIENT)
Age: 69
End: 2019-05-12

## 2019-05-12 VITALS
TEMPERATURE: 98 F | RESPIRATION RATE: 17 BRPM | DIASTOLIC BLOOD PRESSURE: 64 MMHG | HEART RATE: 70 BPM | SYSTOLIC BLOOD PRESSURE: 114 MMHG | OXYGEN SATURATION: 97 %

## 2019-05-12 LAB
ANION GAP SERPL CALC-SCNC: 13 MMOL/L — SIGNIFICANT CHANGE UP (ref 5–17)
BUN SERPL-MCNC: 17 MG/DL — SIGNIFICANT CHANGE UP (ref 8–20)
CALCIUM SERPL-MCNC: 8.5 MG/DL — LOW (ref 8.6–10.2)
CHLORIDE SERPL-SCNC: 100 MMOL/L — SIGNIFICANT CHANGE UP (ref 98–107)
CO2 SERPL-SCNC: 25 MMOL/L — SIGNIFICANT CHANGE UP (ref 22–29)
CREAT SERPL-MCNC: 0.81 MG/DL — SIGNIFICANT CHANGE UP (ref 0.5–1.3)
GLUCOSE SERPL-MCNC: 97 MG/DL — SIGNIFICANT CHANGE UP (ref 70–115)
HCT VFR BLD CALC: 25.2 % — LOW (ref 42–52)
HGB BLD-MCNC: 8 G/DL — LOW (ref 14–18)
MAGNESIUM SERPL-MCNC: 2 MG/DL — SIGNIFICANT CHANGE UP (ref 1.6–2.6)
MCHC RBC-ENTMCNC: 23.5 PG — LOW (ref 27–31)
MCHC RBC-ENTMCNC: 31.7 G/DL — LOW (ref 32–36)
MCV RBC AUTO: 73.9 FL — LOW (ref 80–94)
PLATELET # BLD AUTO: 275 K/UL — SIGNIFICANT CHANGE UP (ref 150–400)
POTASSIUM SERPL-MCNC: 3.7 MMOL/L — SIGNIFICANT CHANGE UP (ref 3.5–5.3)
POTASSIUM SERPL-SCNC: 3.7 MMOL/L — SIGNIFICANT CHANGE UP (ref 3.5–5.3)
RBC # BLD: 3.41 M/UL — LOW (ref 4.6–6.2)
RBC # FLD: 15.5 % — SIGNIFICANT CHANGE UP (ref 11–15.6)
SODIUM SERPL-SCNC: 138 MMOL/L — SIGNIFICANT CHANGE UP (ref 135–145)
WBC # BLD: 8.6 K/UL — SIGNIFICANT CHANGE UP (ref 4.8–10.8)
WBC # FLD AUTO: 8.6 K/UL — SIGNIFICANT CHANGE UP (ref 4.8–10.8)

## 2019-05-12 PROCEDURE — 86803 HEPATITIS C AB TEST: CPT

## 2019-05-12 PROCEDURE — 84295 ASSAY OF SERUM SODIUM: CPT

## 2019-05-12 PROCEDURE — C1894: CPT

## 2019-05-12 PROCEDURE — 83036 HEMOGLOBIN GLYCOSYLATED A1C: CPT

## 2019-05-12 PROCEDURE — 85014 HEMATOCRIT: CPT

## 2019-05-12 PROCEDURE — 93312 ECHO TRANSESOPHAGEAL: CPT

## 2019-05-12 PROCEDURE — 36430 TRANSFUSION BLD/BLD COMPNT: CPT

## 2019-05-12 PROCEDURE — 87640 STAPH A DNA AMP PROBE: CPT

## 2019-05-12 PROCEDURE — 85027 COMPLETE CBC AUTOMATED: CPT

## 2019-05-12 PROCEDURE — 84132 ASSAY OF SERUM POTASSIUM: CPT

## 2019-05-12 PROCEDURE — G0378: CPT

## 2019-05-12 PROCEDURE — 93880 EXTRACRANIAL BILAT STUDY: CPT

## 2019-05-12 PROCEDURE — 80053 COMPREHEN METABOLIC PANEL: CPT

## 2019-05-12 PROCEDURE — 36415 COLL VENOUS BLD VENIPUNCTURE: CPT

## 2019-05-12 PROCEDURE — 82803 BLOOD GASES ANY COMBINATION: CPT

## 2019-05-12 PROCEDURE — 81001 URINALYSIS AUTO W/SCOPE: CPT

## 2019-05-12 PROCEDURE — C1751: CPT

## 2019-05-12 PROCEDURE — 71046 X-RAY EXAM CHEST 2 VIEWS: CPT

## 2019-05-12 PROCEDURE — 93325 DOPPLER ECHO COLOR FLOW MAPG: CPT

## 2019-05-12 PROCEDURE — 80061 LIPID PANEL: CPT

## 2019-05-12 PROCEDURE — C1769: CPT

## 2019-05-12 PROCEDURE — 97163 PT EVAL HIGH COMPLEX 45 MIN: CPT

## 2019-05-12 PROCEDURE — 87086 URINE CULTURE/COLONY COUNT: CPT

## 2019-05-12 PROCEDURE — 99239 HOSP IP/OBS DSCHRG MGMT >30: CPT | Mod: 24

## 2019-05-12 PROCEDURE — 97116 GAIT TRAINING THERAPY: CPT

## 2019-05-12 PROCEDURE — 97530 THERAPEUTIC ACTIVITIES: CPT

## 2019-05-12 PROCEDURE — 93306 TTE W/DOPPLER COMPLETE: CPT

## 2019-05-12 PROCEDURE — 93454 CORONARY ARTERY ANGIO S&I: CPT

## 2019-05-12 PROCEDURE — 71045 X-RAY EXAM CHEST 1 VIEW: CPT

## 2019-05-12 PROCEDURE — 82550 ASSAY OF CK (CPK): CPT

## 2019-05-12 PROCEDURE — P9016: CPT

## 2019-05-12 PROCEDURE — 84484 ASSAY OF TROPONIN QUANT: CPT

## 2019-05-12 PROCEDURE — 94002 VENT MGMT INPAT INIT DAY: CPT

## 2019-05-12 PROCEDURE — 83735 ASSAY OF MAGNESIUM: CPT

## 2019-05-12 PROCEDURE — 82947 ASSAY GLUCOSE BLOOD QUANT: CPT

## 2019-05-12 PROCEDURE — 83605 ASSAY OF LACTIC ACID: CPT

## 2019-05-12 PROCEDURE — C1889: CPT

## 2019-05-12 PROCEDURE — 86900 BLOOD TYPING SEROLOGIC ABO: CPT

## 2019-05-12 PROCEDURE — 82962 GLUCOSE BLOOD TEST: CPT

## 2019-05-12 PROCEDURE — 83690 ASSAY OF LIPASE: CPT

## 2019-05-12 PROCEDURE — 86923 COMPATIBILITY TEST ELECTRIC: CPT

## 2019-05-12 PROCEDURE — 82553 CREATINE MB FRACTION: CPT

## 2019-05-12 PROCEDURE — 71045 X-RAY EXAM CHEST 1 VIEW: CPT | Mod: 26

## 2019-05-12 PROCEDURE — 86850 RBC ANTIBODY SCREEN: CPT

## 2019-05-12 PROCEDURE — 99152 MOD SED SAME PHYS/QHP 5/>YRS: CPT

## 2019-05-12 PROCEDURE — 86901 BLOOD TYPING SEROLOGIC RH(D): CPT

## 2019-05-12 PROCEDURE — 80048 BASIC METABOLIC PNL TOTAL CA: CPT

## 2019-05-12 PROCEDURE — 84100 ASSAY OF PHOSPHORUS: CPT

## 2019-05-12 PROCEDURE — C1887: CPT

## 2019-05-12 PROCEDURE — 93005 ELECTROCARDIOGRAM TRACING: CPT

## 2019-05-12 PROCEDURE — 75574 CT ANGIO HRT W/3D IMAGE: CPT

## 2019-05-12 PROCEDURE — 94010 BREATHING CAPACITY TEST: CPT

## 2019-05-12 PROCEDURE — 85610 PROTHROMBIN TIME: CPT

## 2019-05-12 PROCEDURE — 93320 DOPPLER ECHO COMPLETE: CPT

## 2019-05-12 PROCEDURE — 87641 MR-STAPH DNA AMP PROBE: CPT

## 2019-05-12 PROCEDURE — 99285 EMERGENCY DEPT VISIT HI MDM: CPT | Mod: 25

## 2019-05-12 PROCEDURE — 80076 HEPATIC FUNCTION PANEL: CPT

## 2019-05-12 PROCEDURE — 84134 ASSAY OF PREALBUMIN: CPT

## 2019-05-12 PROCEDURE — 85730 THROMBOPLASTIN TIME PARTIAL: CPT

## 2019-05-12 PROCEDURE — P9045: CPT

## 2019-05-12 PROCEDURE — 82330 ASSAY OF CALCIUM: CPT

## 2019-05-12 PROCEDURE — 76705 ECHO EXAM OF ABDOMEN: CPT

## 2019-05-12 PROCEDURE — 83880 ASSAY OF NATRIURETIC PEPTIDE: CPT

## 2019-05-12 PROCEDURE — 82435 ASSAY OF BLOOD CHLORIDE: CPT

## 2019-05-12 RX ORDER — PANTOPRAZOLE SODIUM 20 MG/1
1 TABLET, DELAYED RELEASE ORAL
Qty: 14 | Refills: 0
Start: 2019-05-12 | End: 2019-05-25

## 2019-05-12 RX ORDER — FUROSEMIDE 40 MG
1 TABLET ORAL
Qty: 7 | Refills: 0
Start: 2019-05-12 | End: 2019-05-18

## 2019-05-12 RX ORDER — DILTIAZEM HCL 120 MG
1 CAPSULE, EXT RELEASE 24 HR ORAL
Qty: 270 | Refills: 0
Start: 2019-05-12 | End: 2019-08-09

## 2019-05-12 RX ORDER — POTASSIUM CHLORIDE 20 MEQ
40 PACKET (EA) ORAL ONCE
Refills: 0 | Status: COMPLETED | OUTPATIENT
Start: 2019-05-12 | End: 2019-05-12

## 2019-05-12 RX ORDER — METOPROLOL TARTRATE 50 MG
1 TABLET ORAL
Qty: 60 | Refills: 1
Start: 2019-05-12 | End: 2019-07-10

## 2019-05-12 RX ORDER — ASPIRIN/CALCIUM CARB/MAGNESIUM 324 MG
1 TABLET ORAL
Qty: 0 | Refills: 0 | DISCHARGE

## 2019-05-12 RX ORDER — AMLODIPINE BESYLATE 2.5 MG/1
1 TABLET ORAL
Qty: 0 | Refills: 0 | DISCHARGE

## 2019-05-12 RX ORDER — ATORVASTATIN CALCIUM 80 MG/1
1 TABLET, FILM COATED ORAL
Qty: 30 | Refills: 1
Start: 2019-05-12 | End: 2019-07-10

## 2019-05-12 RX ORDER — GEMFIBROZIL 600 MG
1 TABLET ORAL
Qty: 0 | Refills: 0 | DISCHARGE
Start: 2019-05-12

## 2019-05-12 RX ORDER — SPIRONOLACTONE 25 MG/1
1 TABLET, FILM COATED ORAL
Qty: 7 | Refills: 0
Start: 2019-05-12 | End: 2019-05-18

## 2019-05-12 RX ORDER — GEMFIBROZIL 600 MG
1 TABLET ORAL
Qty: 0 | Refills: 0 | DISCHARGE

## 2019-05-12 RX ORDER — DOCUSATE SODIUM 100 MG
1 CAPSULE ORAL
Qty: 0 | Refills: 0 | DISCHARGE
Start: 2019-05-12

## 2019-05-12 RX ORDER — ASPIRIN/CALCIUM CARB/MAGNESIUM 324 MG
1 TABLET ORAL
Qty: 0 | Refills: 0 | DISCHARGE
Start: 2019-05-12

## 2019-05-12 RX ADMIN — Medication 100 MILLIGRAM(S): at 06:02

## 2019-05-12 RX ADMIN — Medication 325 MILLIGRAM(S): at 09:07

## 2019-05-12 RX ADMIN — Medication 500 MILLIGRAM(S): at 09:08

## 2019-05-12 RX ADMIN — Medication 25 MILLIGRAM(S): at 06:01

## 2019-05-12 RX ADMIN — Medication 325 MILLIGRAM(S): at 06:01

## 2019-05-12 RX ADMIN — Medication 40 MILLIEQUIVALENT(S): at 09:08

## 2019-05-12 RX ADMIN — Medication 30 MILLIGRAM(S): at 06:02

## 2019-05-12 RX ADMIN — PANTOPRAZOLE SODIUM 40 MILLIGRAM(S): 20 TABLET, DELAYED RELEASE ORAL at 06:02

## 2019-05-12 RX ADMIN — Medication 40 MILLIGRAM(S): at 06:01

## 2019-05-12 RX ADMIN — Medication 600 MILLIGRAM(S): at 06:01

## 2019-05-12 RX ADMIN — SODIUM CHLORIDE 3 MILLILITER(S): 9 INJECTION INTRAMUSCULAR; INTRAVENOUS; SUBCUTANEOUS at 06:02

## 2019-05-12 RX ADMIN — Medication 1 MILLIGRAM(S): at 09:07

## 2019-05-12 RX ADMIN — Medication 20 MILLIEQUIVALENT(S): at 11:55

## 2019-05-12 NOTE — PROGRESS NOTE ADULT - REASON FOR ADMISSION
chest pain

## 2019-05-12 NOTE — DISCHARGE NOTE PROVIDER - CARE PROVIDERS DIRECT ADDRESSES
[email or web address]

## 2019-05-12 NOTE — DISCHARGE NOTE PROVIDER - CARE PROVIDER_API CALL
Molina Soto)  Surgery; Thoracic and Cardiac Surgery  [address] [place] [address]  Phone: [phone]  Fax: [phone]  Follow Up Time:     Patricio Simpson (DO)  Cardiovascular Disease; Interventional Cardiology  [address] [place] [address]  Phone: [phone]  Fax: [phone]  Follow Up Time: Molina Soto)  Surgery; Thoracic and Cardiac Surgery  [address] [place] [address]  Phone: [phone]  Fax: [phone]  Follow Up Time: 2 weeks    Jhoan Duran)  Cardiovascular Disease; Internal Medicine  [address] [place] [address]  Phone: [phone]  Fax: [phone]  Follow Up Time: 1 month

## 2019-05-12 NOTE — DISCHARGE NOTE PROVIDER - NSDCACTIVITY_GEN_ALL_CORE
No restrictions/Do not make important decisions/Stairs allowed/Walking - Indoors allowed/No heavy lifting/straining/Walking - Outdoors allowed/Showering allowed/Do not drive or operate machinery/Bathing allowed

## 2019-05-12 NOTE — DISCHARGE NOTE PROVIDER - NSDCFUADDINST_GEN_ALL_CORE_FT
Please come to ED or Call Cardio thoracic office at [phone] if Chest pain, Shortness of Breath, persistant Nausea & vomiting, oozing from wounds,palpitations or pain not relieved with medication  Weigh yourself daily>notify surgeons office if  2 lb increase in weight in 24 hours.  Wash incisions with soap and water using washcloth in shower daily  Shower daily, no bathing swimming in a pool or the ocean until instructed by MD.    We advise that you do not drive until instructed by MD.   You may not return to work until instructed by MD.   DO NOT APPLY CREAM POWDER LOTION OR OINTMENT TO ANY SURGICAL WOUND>THIS CAN IMPEDE HEALING AND CAUSE AN INFECTION    Please eat a low fat low salt diet.

## 2019-05-12 NOTE — DISCHARGE NOTE PROVIDER - NSDCPNSUBOBJ_GEN_ALL_CORE
Subjective:  "I feel good today, I have been walking a lot"    OOB chair            Tele:  SR   70s                 V/S:                      T(F): 98.1 (19 @ 05:58), Max: 98.6 (19 @ 16:41)    HR: 74 (19 @ 05:58) (70 - 74)    BP: 120/68 (19 @ 05:58) (108/64 - 122/74)    RR: 18 (19 @ 05:58) (17 - 20)    SpO2: 96% (19 @ 05:58) (96% - 98%)            LV EF:  50%            Labs:            138  |  100  |  17.0    ----------------------------<  97    3.7   |  25.0  |  0.81        Ca    8.5<L>      12 May 2019 06:06    Phos  2.9         Mg     2.0                                            8.0      8.6   )-----------( 275      ( 12 May 2019 06:06 )               25.2                 CAPILLARY BLOOD GLUCOSE            POCT Blood Glucose.: 122 mg/dL (11 May 2019 12:08)                 CXR: < from: Xray Chest 1 View- PORTABLE-Routine (19 @ 03:13) >        Cardiomegaly No evidence of active chest disease        I&O's Detail        11 May 2019 07:01  -  12 May 2019 07:00    --------------------------------------------------------    IN:      Solution: 50 mL    Total IN: 50 mL        OUT:    Total OUT: 0 mL        Total NET: 50 mL                    CHEST TUBE:  [ ] YES [ ]x NO  OUTPUT:     per 24 hours    AIR LEAKS:  [ ] YES [ ] NO          KENNY DRAIN:   [ ] YES [ x] NO  OUTPUT:     per 24 hours        EPICARDIAL WIRES:  [ ] YES [ x] NO          BOWEL MOVEMENT:  [x ] YES [ ] NO          Daily       Daily Weight in k.8 (12 May 2019 05:00)    Medications:    ascorbic acid 500 milliGRAM(s) Oral daily    aspirin enteric coated 325 milliGRAM(s) Oral daily    atorvastatin 40 milliGRAM(s) Oral at bedtime    chlorhexidine 2% Cloths 1 Application(s) Topical daily    diltiazem    Tablet 30 milliGRAM(s) Oral three times a day    docusate sodium 100 milliGRAM(s) Oral three times a day    enoxaparin Injectable 40 milliGRAM(s) SubCutaneous daily    ferrous    sulfate 325 milliGRAM(s) Oral three times a day    folic acid 1 milliGRAM(s) Oral daily    furosemide    Tablet 40 milliGRAM(s) Oral daily    gemfibrozil 600 milliGRAM(s) Oral two times a day    glucagon  Injectable 1 milliGRAM(s) IntraMuscular once PRN    metoprolol tartrate 25 milliGRAM(s) Oral two times a day    oxyCODONE    5 mG/acetaminophen 325 mG 1 Tablet(s) Oral every 4 hours PRN    oxyCODONE    5 mG/acetaminophen 325 mG 2 Tablet(s) Oral every 4 hours PRN    pantoprazole    Tablet 40 milliGRAM(s) Oral before breakfast    polyethylene glycol 3350 17 Gram(s) Oral daily PRN    potassium chloride    Tablet ER 20 milliEquivalent(s) Oral daily    senna 2 Tablet(s) Oral at bedtime    sodium chloride 0.9% lock flush 3 milliLiter(s) IV Push every 8 hours                Physical Exam:        Neuro: alert, no deficits        Pulm: essentially clear        CV:  S1  S2   RRR        Abd: soft  non tender  Reports BM today        Extremities:  LLE   SVG  site with calf, thigh staples  + ecchymosis > serosang drainage noted    Trace edema B/L lower extrem  LUE   radial harvest site + ecchymosis along incision    Fingers warm, with brisk capillary refill        Incision(s): sternum stable, midline staples in place w/ dried blood  +  silks                           PAST MEDICAL & SURGICAL HISTORY:    ETOH abuse    Esophageal varices    Hyperlipidemia    Hypertension    No significant past surgical history            I spent  35 minutes to complete discharge,including medication instruction,routine discharge care, post op complications and concerns, and follow up care including surgeon appointment

## 2019-05-12 NOTE — DISCHARGE NOTE PROVIDER - NSDCFUADDAPPT_GEN_ALL_CORE_FT
Please follow up with Dr. Soto in 2 weeks  Call Monday to arrange appt  [phone]  Cardiac Surgery office in  [facility]. Take elevator to second floor, follow signs to office    [facility] [employer] Nurse Practitioner  [phone]   Judie Thao  They will call you to arrange complimentary home visits post operatively

## 2019-05-12 NOTE — PROGRESS NOTE ADULT - SUBJECTIVE AND OBJECTIVE BOX
[facility]                              [address] [address]                                                 PHONE: [phone]                                                 FAX: [phone]  -----------------------------------------------------------------------------------------------  Pt seen and examined. FU for CAD    Overnight events/Complaints: Pt with mild chest pain. Ambulated. Feels better.     Seen with son at bedside.  Recovering well      Vital Signs Last 24 Hrs  T(C): 36.8 (10 May 2019 05:26), Max: 37.2 (09 May 2019 17:44)  T(F): 98.3 (10 May 2019 05:26), Max: 98.9 (09 May 2019 17:44)  HR: 79 (10 May 2019 05:26) (70 - 83)  BP: 107/68 (10 May 2019 05:26) (94/53 - 131/77)  BP(mean): 69 (09 May 2019 20:00) (69 - 103)  RR: 14 (10 May 2019 05:26) (11 - 48)  SpO2: 97% (10 May 2019 05:26) (92% - 98%)  I&O's Summary    09 May 2019 07:01  -  10 May 2019 07:00  --------------------------------------------------------  IN: 1152 mL / OUT: 4365 mL / NET: -3213 mL    PHYSICAL EXAM:  Constitutional: Appears well developed, well nourished; alert and co-operative  HEENT:     Head: Normocephalic and atraumatic  Neck: supple. No JVD  Cardiovascular: Normal S1 S2, No murmurs  Respiratory: L side chest tube  Gastrointestinal:  Soft, Non-tender, + BS	  Musculoskeletal: Normal range of motion. No edema  Skin: Warm and dry. No cyanosis . No diaphoresis.  Neurologic: Alert oriented to time place and person. Normal strength and no tremor.        LABS:                          7.9    11.2  )-----------( 171      ( 10 May 2019 06:32 )             24.3     05-10    137  |  96<L>  |  22.0<H>  ----------------------------<  95  3.9   |  27.0  |  0.88    Ca    8.2<L>      10 May 2019 06:32  Mg     2.0     05-10    RADIOLOGY & ADDITIONAL STUDIES: (reviewed)    CARDIOLOGY TESTING:(reviewed)     TELEMETRY (Independent visualization) : No arrhythmias    ECHOCARDIOGRAM:  < from: TTE Echo Complete w/Doppler (05.02.19 @ 11:49) >  Summary:   1. Left ventricular ejection fraction, by visual estimation, is 50 to   55%.   2. Normal global left ventricular systolic function.   3. Apical anterior segment, apical inferior segment, and apex are   abnormal as described above.   4. Spectral Doppler shows impaired relaxation pattern of left   ventricular myocardial filling (Grade I diastolic dysfunction).   5. There is mild concentric left ventricular hypertrophy.   6. Endocardial visualization was enhanced with intravenous echo contrast.    MD Mat Electronically signed on 5/2/2019 at 3:33:16 PM    < end of copied text >    CARDIAC CATHETERIZATION:  < from: Cardiac Cath Lab - Adult (05.01.19 @ 17:22) >  CORONARY VESSELS: The coronary circulation is right dominant.  LAD:   --  Proximal LAD: There was a tubular 90 % stenosis. The lesion was  irregularly contoured and calcified.  CX:   --  Mid circumflex: There was a [id number] % stenosis. The lesion was  irregularly contoured and ulcerated.  RCA:   --  Distal RCA: There was a 70 % stenosis.  COMPLICATIONS: No complications occurred during the cath lab visit.  DIAGNOSTIC RECOMMENDATIONS: Patient with abnormal CTA and UA  Cath reveals significant stenosis of the pLAD, mLCx and dRCA.  Plan: CABG eval with Dr Soto  Discussed with Dr Delvin Jacob ([facility])  Prepared and signed by  Patricio Simpson MD  Signed 05/01/2019 18:03:00    < end of copied text >      MEDICATIONS:(reviewed)  MEDICATIONS  (STANDING):  aspirin enteric coated 325 milliGRAM(s) Oral daily  atorvastatin 40 milliGRAM(s) Oral at bedtime  chlorhexidine 2% Cloths 1 Application(s) Topical daily  dextrose 5%. 1000 milliLiter(s) (50 mL/Hr) IV Continuous <Continuous>  dextrose 50% Injectable 12.5 Gram(s) IV Push once  dextrose 50% Injectable 25 Gram(s) IV Push once  dextrose 50% Injectable 25 Gram(s) IV Push once  dextrose 50% Injectable 50 milliLiter(s) IV Push every 15 minutes  dextrose 50% Injectable 25 milliLiter(s) IV Push every 15 minutes  diltiazem    Tablet 30 milliGRAM(s) Oral three times a day  docusate sodium 100 milliGRAM(s) Oral three times a day  enoxaparin Injectable 40 milliGRAM(s) SubCutaneous daily  furosemide    Tablet 40 milliGRAM(s) Oral every 12 hours  gemfibrozil 600 milliGRAM(s) Oral two times a day  insulin lispro (HumaLOG) corrective regimen sliding scale   SubCutaneous three times a day before meals  insulin lispro Injectable (HumaLOG) 2 Unit(s) SubCutaneous three times a day before meals  metoprolol tartrate 25 milliGRAM(s) Oral two times a day  pantoprazole    Tablet 40 milliGRAM(s) Oral before breakfast  senna 2 Tablet(s) Oral at bedtime  sodium chloride 0.9% lock flush 3 milliLiter(s) IV Push every 8 hours  sodium chloride 0.9%. 1000 milliLiter(s) (10 mL/Hr) IV Continuous <Continuous>  sodium chloride 0.9%. 1000 milliLiter(s) (5 mL/Hr) IV Continuous <Continuous>    MEDICATIONS  (PRN):  dextrose 40% Gel 15 Gram(s) Oral once PRN Blood Glucose LESS THAN 70 milliGRAM(s)/deciliter  glucagon  Injectable 1 milliGRAM(s) IntraMuscular once PRN Glucose LESS THAN 70 milligrams/deciliter  oxyCODONE    5 mG/acetaminophen 325 mG 1 Tablet(s) Oral every 4 hours PRN Moderate Pain (4 - 6)  oxyCODONE    5 mG/acetaminophen 325 mG 2 Tablet(s) Oral every 4 hours PRN Severe Pain (7 - 10)  polyethylene glycol 3350 17 Gram(s) Oral daily PRN Constipation    ASSESSMENT AND PLAN:    67 yo male obese with HTN , alcoholism presented to the ED with complaints of left sided chest pain and shortness of breath. Cath with TVD  S/P TV CABG using LIMA to LAD/ROMY to RCA and left radial to circ    Doing well OOB   Presently in tele    1. CAD: Continue ASA, metoprolol and statin  2. Monitor H/H  3. Mx as per CTS  4. Discharge home, will follow up with me at [facility] in two weeks.

## 2019-05-12 NOTE — PROGRESS NOTE ADULT - PROVIDER SPECIALTY LIST ADULT
Anesthesia
CT Surgery
Cardiology
Hospitalist
Urology
Cardiology
Hospitalist

## 2019-05-12 NOTE — DISCHARGE NOTE PROVIDER - NSDCCPCAREPLAN_GEN_ALL_CORE_FT
PRINCIPAL DISCHARGE DIAGNOSIS  Diagnosis: S/P CABG x 3  Assessment and Plan of Treatment: ASA statin betablocker  Cardizem for radial graft harvest x 90 days  low dose diuretics fluid overload  Replete potassium

## 2019-05-12 NOTE — DISCHARGE NOTE PROVIDER - PROVIDER TOKENS
PROVIDER:[TOKEN:[[id number]]],PROVIDER:[TOKEN:[[id number]]] PROVIDER:[TOKEN:[[id number]],FOLLOWUP:[2 weeks]],PROVIDER:[TOKEN:[[id number]],FOLLOWUP:[1 month]]

## 2019-05-12 NOTE — DISCHARGE NOTE PROVIDER - INSTRUCTIONS
low salt low fat  Do not drink more than four  8 ounce glasses of beverage in a 24 hour period during your first 2 weeks home

## 2019-05-16 ENCOUNTER — APPOINTMENT (OUTPATIENT)
Age: 69
End: 2019-05-16
Payer: MEDICARE

## 2019-05-16 VITALS
SYSTOLIC BLOOD PRESSURE: 118 MMHG | WEIGHT: 224 LBS | BODY MASS INDEX: 33.18 KG/M2 | HEIGHT: 69 IN | OXYGEN SATURATION: 98 % | DIASTOLIC BLOOD PRESSURE: 78 MMHG | HEART RATE: 76 BPM | RESPIRATION RATE: 16 BRPM

## 2019-05-16 DIAGNOSIS — Z86.79 PERSONAL HISTORY OF OTHER DISEASES OF THE CIRCULATORY SYSTEM: ICD-10-CM

## 2019-05-16 DIAGNOSIS — I25.10 ATHEROSCLEROTIC HEART DISEASE OF NATIVE CORONARY ARTERY W/OUT ANGINA PECTORIS: ICD-10-CM

## 2019-05-16 DIAGNOSIS — Z87.891 PERSONAL HISTORY OF NICOTINE DEPENDENCE: ICD-10-CM

## 2019-05-16 DIAGNOSIS — Z78.9 OTHER SPECIFIED HEALTH STATUS: ICD-10-CM

## 2019-05-16 PROCEDURE — 99024 POSTOP FOLLOW-UP VISIT: CPT

## 2019-05-16 RX ORDER — DILTIAZEM HYDROCHLORIDE 30 MG/1
30 TABLET, COATED ORAL 3 TIMES DAILY
Refills: 0 | Status: ACTIVE | COMMUNITY

## 2019-05-16 RX ORDER — OXYCODONE HYDROCHLORIDE AND ACETAMINOPHEN 5; 325 MG/1; MG/1
5-325 TABLET ORAL
Refills: 0 | Status: ACTIVE | COMMUNITY

## 2019-05-16 RX ORDER — ASPIRIN 325 MG/1
325 TABLET, FILM COATED ORAL
Refills: 0 | Status: ACTIVE | COMMUNITY

## 2019-05-16 RX ORDER — ATORVASTATIN CALCIUM 40 MG/1
40 TABLET, FILM COATED ORAL
Refills: 0 | Status: ACTIVE | COMMUNITY

## 2019-05-16 RX ORDER — METOPROLOL TARTRATE 25 MG/1
25 TABLET, FILM COATED ORAL TWICE DAILY
Refills: 0 | Status: ACTIVE | COMMUNITY

## 2019-05-16 NOTE — REVIEW OF SYSTEMS
[Sleep Disturbances] : sleep disturbances [Negative] : Neurological [Suicidal] : not suicidal [Anxiety] : no anxiety [Depression] : no depression [FreeTextEntry7] : last BM today

## 2019-05-16 NOTE — REASON FOR VISIT
[Follow-Up: _____] : a [unfilled] follow-up visit [Spouse] : spouse [FreeTextEntry1] : FOLLOW YOUR HEART- Transitional Care Managment Program- [facility] [employer] [facility]\par \par

## 2019-05-16 NOTE — HISTORY OF PRESENT ILLNESS
[FreeTextEntry1] : 68 YOM with pmhx including HLD, HTN, no significant past surgical history. Pt underwent a CABG x3 with Dr. Soto utilizing all arterial harvest for grafting. Pt post op course unremarkable. Pt discharged home with home care services ([facility]) and support of family. Initial [facility] visit. \par CC "I'm feeling pretty good" no new complaints

## 2019-05-16 NOTE — ASSESSMENT
[FreeTextEntry1] : 68 YOM with pmhx including HLD, HTN, no significant past surgical history. Pt underwent a CABG x3 with Dr. Soto utilizing all arterial harvest for grafting. Pt post op course unremarkable. Pt discharged home with home care services ([facility]) and support of family. Initial [facility] visit. \par \par Pt recovering well at home with no new symptoms, issues or concerns. Reviewed all medications with pt and pt spouse understanding. Pt to take 4 tabs of ASA 81 mg until obtaining prescribed dose of  mg. All other meds present in home. Spoke with pt dtr Cristy who is a PA with the [facility] system and advised of ASA dose and confirmed med rec as well. \par Staples removed from LE without issue. \par Reviewed diet and exercise/physical limitations with pt. Encouraged to refrain from working at this time. Pt with understanding.

## 2019-05-28 ENCOUNTER — APPOINTMENT (OUTPATIENT)
Dept: CARDIOTHORACIC SURGERY | Facility: CLINIC | Age: 69
End: 2019-05-28

## 2019-05-28 VITALS
OXYGEN SATURATION: 98 % | HEIGHT: 69 IN | WEIGHT: 225 LBS | DIASTOLIC BLOOD PRESSURE: 83 MMHG | BODY MASS INDEX: 33.33 KG/M2 | HEART RATE: 73 BPM | SYSTOLIC BLOOD PRESSURE: 137 MMHG | RESPIRATION RATE: 16 BRPM

## 2019-05-28 RX ORDER — SPIRONOLACTONE 25 MG/1
25 TABLET, FILM COATED ORAL
Refills: 0 | Status: COMPLETED | COMMUNITY
End: 2019-05-28

## 2019-05-28 RX ORDER — FUROSEMIDE 40 MG/1
40 TABLET ORAL
Refills: 0 | Status: COMPLETED | COMMUNITY
End: 2019-05-28

## 2019-05-28 RX ORDER — PANTOPRAZOLE 40 MG/1
40 TABLET, DELAYED RELEASE ORAL
Refills: 0 | Status: COMPLETED | COMMUNITY
End: 2019-05-28

## 2019-06-13 ENCOUNTER — TRANSCRIPTION ENCOUNTER (OUTPATIENT)
Age: 69
End: 2019-06-13

## 2020-08-24 ENCOUNTER — TRANSCRIPTION ENCOUNTER (OUTPATIENT)
Age: 70
End: 2020-08-24

## 2021-04-24 ENCOUNTER — TRANSCRIPTION ENCOUNTER (OUTPATIENT)
Age: 71
End: 2021-04-24

## 2021-04-26 ENCOUNTER — TRANSCRIPTION ENCOUNTER (OUTPATIENT)
Age: 71
End: 2021-04-26

## 2021-04-30 ENCOUNTER — TRANSCRIPTION ENCOUNTER (OUTPATIENT)
Age: 71
End: 2021-04-30

## 2021-06-18 NOTE — PHYSICAL EXAM
[Sclera] : the sclera and conjunctiva were normal [Neck Appearance] : the appearance of the neck was normal [Respiration, Rhythm And Depth] : normal respiratory rhythm and effort [Auscultation Breath Sounds / Voice Sounds] : lungs were clear to auscultation bilaterally [Apical Impulse] : the apical impulse was normal [Heart Rate And Rhythm] : heart rate was normal and rhythm regular [Heart Sounds] : normal S1 and S2 [Diminished Respiratory Excursion] : normal chest expansion [Chest Visual Inspection Thoracic Asymmetry] : no chest asymmetry [1+] : left 1+ [Abdomen Soft] : soft [Abdomen Tenderness] : non-tender [Abnormal Walk] : normal gait [Skin Turgor] : normal skin turgor [Skin Color & Pigmentation] : normal skin color and pigmentation [] : no rash [Oriented To Time, Place, And Person] : oriented to person, place, and time [Impaired Insight] : insight and judgment were intact [Affect] : the affect was normal [Fingers] :  capillary refill of the fingers was normal [Left Fingers] :  capillary refill of the left fingers was normal [FreeTextEntry2] : +left ulnar pulse, B/L LE without edema, B/L calves soft, NT [FreeTextEntry1] : V LLE-harvest site without erythema, warmth or drainage, staples removed, no issues, edges well approximated. Left arm radial artery harvest site without erythema, drainage or warmth. Soft resolving ecchymosis Plan: Morning\\n- wash face with glycolic acid wash\\n- apply Azelaic acid to all acne spots\\n- apply moisturizer with SPF\\n\\nEvening\\n- wash face/chest/back with BPO wash leave on 3-5 minutes\\n- apply Tazorac .05% to affected areas on face and back 1-2 times a week; alternate use with tretinoin until able to tolerate tazorac\\n- apply moisturizer \\n\\n- spironolactone 100mg po QD x 2 weeks then increase to 125mg po QD \\n\\nWill consider Spironolactone 150mg at next visit Detail Level: Zone

## 2021-07-23 ENCOUNTER — TRANSCRIPTION ENCOUNTER (OUTPATIENT)
Age: 71
End: 2021-07-23

## 2021-12-09 ENCOUNTER — TRANSCRIPTION ENCOUNTER (OUTPATIENT)
Age: 71
End: 2021-12-09

## 2022-03-29 NOTE — PATIENT PROFILE ADULT - ANY SIGNIFICANT CHANGE IN ABILITY TO PERFORM THE FOLLOWING ADL SINCE THE ONSET OF PRESENT ILLNESS?
We will call you if your test result comes back positive  Take antibiotics as prescribed  Please follow-up with your family doctor 
no

## 2022-06-15 NOTE — ED CDU PROVIDER INITIAL DAY NOTE - DETAILS
6/27/22 visit - notes added   68 yea old with htn, hld presents with chets pain, palced on obs for tele, serial trop, ct cardiac

## 2022-06-24 NOTE — ED ADULT NURSE NOTE - NEURO WDL
Alert and oriented to person, place and time, memory intact, behavior appropriate to situation, PERRL. There are no Wet Read(s) to document.

## 2022-08-10 ENCOUNTER — NON-APPOINTMENT (OUTPATIENT)
Age: 72
End: 2022-08-10

## 2023-08-03 ENCOUNTER — OFFICE (OUTPATIENT)
Dept: URBAN - METROPOLITAN AREA CLINIC 94 | Facility: CLINIC | Age: 73
Setting detail: OPHTHALMOLOGY
End: 2023-08-03
Payer: MEDICARE

## 2023-08-03 DIAGNOSIS — H40.033: ICD-10-CM

## 2023-08-03 DIAGNOSIS — H25.13: ICD-10-CM

## 2023-08-03 PROCEDURE — 92014 COMPRE OPH EXAM EST PT 1/>: CPT | Performed by: OPHTHALMOLOGY

## 2023-08-03 PROCEDURE — 92132 CPTRZD OPH DX IMG ANT SGM: CPT | Performed by: OPHTHALMOLOGY

## 2023-08-03 ASSESSMENT — REFRACTION_AUTOREFRACTION
OS_SPHERE: -1.00
OD_SPHERE: -0.75
OD_CYLINDER: -0.25
OS_CYLINDER: -0.50
OD_AXIS: 165
OS_AXIS: 075

## 2023-08-03 ASSESSMENT — KERATOMETRY
OS_K2POWER_DIOPTERS: 43.00
OD_K2POWER_DIOPTERS: 42.75
OS_AXISANGLE_DEGREES: 085
OS_K1POWER_DIOPTERS: 42.25
OD_AXISANGLE_DEGREES: 100
OD_K1POWER_DIOPTERS: 42.25

## 2023-08-03 ASSESSMENT — REFRACTION_MANIFEST
OD_ADD: +2.75
OS_SPHERE: -0.75
OS_VA1: 20/30-
OD_SPHERE: -0.75
OD_VA1: 20/30-
OS_ADD: +2.75

## 2023-08-03 ASSESSMENT — TONOMETRY
OD_IOP_MMHG: 15
OS_IOP_MMHG: 18

## 2023-08-03 ASSESSMENT — REFRACTION_CURRENTRX
OS_OVR_VA: 20/
OD_OVR_VA: 20/
OD_SPHERE: +2.00
OS_SPHERE: +2.00

## 2023-08-03 ASSESSMENT — VISUAL ACUITY
OS_BCVA: 20/40
OD_BCVA: 20/40

## 2023-08-03 ASSESSMENT — CONFRONTATIONAL VISUAL FIELD TEST (CVF)
OD_FINDINGS: FULL
OS_FINDINGS: FULL

## 2023-08-03 ASSESSMENT — SPHEQUIV_DERIVED
OS_SPHEQUIV: -1.25
OD_SPHEQUIV: -0.875

## 2023-08-03 ASSESSMENT — AXIALLENGTH_DERIVED
OS_AL: 24.429
OD_AL: 24.322

## 2024-11-06 ENCOUNTER — OFFICE (OUTPATIENT)
Dept: URBAN - METROPOLITAN AREA CLINIC 112 | Facility: CLINIC | Age: 74
Setting detail: OPHTHALMOLOGY
End: 2024-11-06
Payer: MEDICARE

## 2024-11-06 DIAGNOSIS — H40.033: ICD-10-CM

## 2024-11-06 DIAGNOSIS — H25.13: ICD-10-CM

## 2024-11-06 PROCEDURE — 92014 COMPRE OPH EXAM EST PT 1/>: CPT | Performed by: OPHTHALMOLOGY

## 2024-11-06 ASSESSMENT — KERATOMETRY
OD_AXISANGLE_DEGREES: 092
OS_AXISANGLE_DEGREES: 066
OD_K2POWER_DIOPTERS: 42.50
OS_K1POWER_DIOPTERS: 42.50
OS_K2POWER_DIOPTERS: 43.00
OD_K1POWER_DIOPTERS: 42.25

## 2024-11-06 ASSESSMENT — REFRACTION_MANIFEST
OD_ADD: +2.75
OD_CYLINDER: -0.50
OS_ADD: +2.75
OD_ADD: +2.75
OD_VA1: 20/30-
OD_SPHERE: -1.25
OS_ADD: +2.75
OD_VA1: 20/40+
OD_AXIS: 165
OS_VA1: 20/30-
OD_SPHERE: -0.75
OS_SPHERE: -0.75
OS_VA1: 20/40+
OS_SPHERE: -1.50
OS_AXIS: 70
OS_CYLINDER: -0.50

## 2024-11-06 ASSESSMENT — VISUAL ACUITY
OS_BCVA: 20/200
OD_BCVA: 20/150

## 2024-11-06 ASSESSMENT — CONFRONTATIONAL VISUAL FIELD TEST (CVF)
OD_FINDINGS: FULL
OS_FINDINGS: FULL

## 2024-11-06 ASSESSMENT — TONOMETRY
OS_IOP_MMHG: 20
OD_IOP_MMHG: 20

## 2024-11-06 ASSESSMENT — REFRACTION_AUTOREFRACTION
OS_SPHERE: -1.50
OS_CYLINDER: -0.50
OD_AXIS: 164
OD_CYLINDER: -1.00
OS_AXIS: 071
OD_SPHERE: -1.25

## 2024-11-06 ASSESSMENT — REFRACTION_CURRENTRX
OS_OVR_VA: 20/
OD_SPHERE: +2.00
OS_SPHERE: +2.00
OD_OVR_VA: 20/

## 2024-11-30 NOTE — DISCHARGE NOTE NURSING/CASE MANAGEMENT/SOCIAL WORK - NSSCTYPOFSERV_GEN_ALL_CORE
Plan: 1) Lexapro 15 -> 20 mg at bedtime (continue)           2) Group and milieu therapy (continue)    Discussed potential risks, benefits, and alternatives to medications with patient, who consented to the above medications.   HOME CARE SERVICES

## 2025-06-04 NOTE — ED ADULT NURSE NOTE - GENITOURINARY ASSESSMENT
